# Patient Record
Sex: FEMALE | Race: WHITE | NOT HISPANIC OR LATINO
[De-identification: names, ages, dates, MRNs, and addresses within clinical notes are randomized per-mention and may not be internally consistent; named-entity substitution may affect disease eponyms.]

---

## 2018-01-08 ENCOUNTER — TRANSCRIPTION ENCOUNTER (OUTPATIENT)
Age: 43
End: 2018-01-08

## 2020-11-18 ENCOUNTER — APPOINTMENT (OUTPATIENT)
Dept: OBGYN | Facility: CLINIC | Age: 45
End: 2020-11-18

## 2021-03-22 ENCOUNTER — APPOINTMENT (OUTPATIENT)
Dept: OBGYN | Facility: CLINIC | Age: 46
End: 2021-03-22
Payer: COMMERCIAL

## 2021-03-22 VITALS
WEIGHT: 187 LBS | BODY MASS INDEX: 31.92 KG/M2 | TEMPERATURE: 98.1 F | HEIGHT: 64 IN | SYSTOLIC BLOOD PRESSURE: 124 MMHG | DIASTOLIC BLOOD PRESSURE: 78 MMHG

## 2021-03-22 DIAGNOSIS — Z01.419 ENCOUNTER FOR GYNECOLOGICAL EXAMINATION (GENERAL) (ROUTINE) W/OUT ABNORMAL FINDINGS: ICD-10-CM

## 2021-03-22 DIAGNOSIS — Z78.9 OTHER SPECIFIED HEALTH STATUS: ICD-10-CM

## 2021-03-22 DIAGNOSIS — Z63.5 DISRUPTION OF FAMILY BY SEPARATION AND DIVORCE: ICD-10-CM

## 2021-03-22 PROCEDURE — 99203 OFFICE O/P NEW LOW 30 MIN: CPT | Mod: 25

## 2021-03-22 PROCEDURE — 99386 PREV VISIT NEW AGE 40-64: CPT

## 2021-03-22 PROCEDURE — 36415 COLL VENOUS BLD VENIPUNCTURE: CPT

## 2021-03-22 PROCEDURE — 99072 ADDL SUPL MATRL&STAF TM PHE: CPT

## 2021-03-22 SDOH — SOCIAL STABILITY - SOCIAL INSECURITY: DISRUPTION OF FAMILY BY SEPARATION AND DIVORCE: Z63.5

## 2021-03-22 NOTE — PHYSICAL EXAM
[Appropriately responsive] : appropriately responsive [Alert] : alert [No Acute Distress] : no acute distress [Soft] : soft [Non-tender] : non-tender [Non-distended] : non-distended [Oriented x3] : oriented x3 [Examination Of The Breasts] : a normal appearance [No Discharge] : no discharge [No Masses] : no breast masses were palpable [Labia Majora] : normal [Labia Minora] : normal [No Bleeding] : There was no active vaginal bleeding [Normal] : normal

## 2021-03-22 NOTE — DISCUSSION/SUMMARY
[FreeTextEntry1] : \par Pap and Hormone labs drawn; will follow up with results.\par \par The definitions of perimenopause and menopause were discussed with the patient.  Advised patient to write down and keep track of her periods. \par \par Encouraged consistent condom use.\par \par Her Last mammo was in 01/16/2018 at [facility]; Bi-Rads 1-Negative with heterogenous dense breasts. Mammo and Breast ultrasound Rx given to patient today.\par \par Advised to try organic coconut oil or olive oil for vaginal dryness.\par \par All of her concerns were addressed, questions answered. Patient verbalized understanding.\par \par rto for gyn annual and prn.

## 2021-03-22 NOTE — HISTORY OF PRESENT ILLNESS
[N] : Patient does not use contraception [Y] : Positive pregnancy history [Menarche Age: ____] : age at menarche was [unfilled] [No] : Patient does not have concerns regarding sex [Currently Active] : currently active [Men] : men [Mammogramdate] : 10/2018 [TextBox_19] : as per pt  [PapSmeardate] : 4/2018 [TextBox_31] : as per pt  [LMPDate] : 3/15/2021 [PGHxTotal] : 2 [[facility] : 2 [[facility] : 2 [FreeTextEntry1] : 3/15/2021

## 2021-03-22 NOTE — REVIEW OF SYSTEMS
[Anxiety] : anxiety [Depression] : depression [Sleep Disturbances] : sleep disturbances [PMS/PMDD Symptoms] : PMS/PMDD symptoms [Negative] : Heme/Lymph [FreeTextEntry7] : burning [de-identified] : motor disturbances

## 2021-03-23 LAB
ESTRADIOL SERPL-MCNC: 30 PG/ML
FSH SERPL-MCNC: 31.8 IU/L
HCG SERPL-MCNC: <1 MIU/ML
LH SERPL-ACNC: 9.4 IU/L
TSH SERPL-ACNC: 1.54 UIU/ML

## 2021-03-24 LAB — HPV HIGH+LOW RISK DNA PNL CVX: NOT DETECTED

## 2021-04-04 LAB — CYTOLOGY CVX/VAG DOC THIN PREP: ABNORMAL

## 2021-04-16 ENCOUNTER — APPOINTMENT (OUTPATIENT)
Dept: FAMILY MEDICINE | Facility: CLINIC | Age: 46
End: 2021-04-16
Payer: COMMERCIAL

## 2021-04-16 VITALS
OXYGEN SATURATION: 96 % | TEMPERATURE: 98.1 F | HEART RATE: 84 BPM | WEIGHT: 187 LBS | HEIGHT: 64 IN | SYSTOLIC BLOOD PRESSURE: 122 MMHG | DIASTOLIC BLOOD PRESSURE: 78 MMHG | BODY MASS INDEX: 31.92 KG/M2

## 2021-04-16 DIAGNOSIS — M51.36 OTHER INTERVERTEBRAL DISC DEGENERATION, LUMBAR REGION: ICD-10-CM

## 2021-04-16 DIAGNOSIS — Z87.39 PERSONAL HISTORY OF OTHER DISEASES OF THE MUSCULOSKELETAL SYSTEM AND CONNECTIVE TISSUE: ICD-10-CM

## 2021-04-16 DIAGNOSIS — Z72.0 TOBACCO USE: ICD-10-CM

## 2021-04-16 DIAGNOSIS — N89.8 OTHER SPECIFIED NONINFLAMMATORY DISORDERS OF VAGINA: ICD-10-CM

## 2021-04-16 DIAGNOSIS — Z87.42 PERSONAL HISTORY OF OTHER DISEASES OF THE FEMALE GENITAL TRACT: ICD-10-CM

## 2021-04-16 DIAGNOSIS — Z23 ENCOUNTER FOR IMMUNIZATION: ICD-10-CM

## 2021-04-16 PROCEDURE — 99386 PREV VISIT NEW AGE 40-64: CPT | Mod: 25

## 2021-04-16 PROCEDURE — G0444 DEPRESSION SCREEN ANNUAL: CPT

## 2021-04-16 PROCEDURE — G0442 ANNUAL ALCOHOL SCREEN 15 MIN: CPT

## 2021-04-16 PROCEDURE — 99072 ADDL SUPL MATRL&STAF TM PHE: CPT

## 2021-04-16 RX ORDER — ALPRAZOLAM 2 MG/1
TABLET ORAL
Refills: 0 | Status: DISCONTINUED | COMMUNITY
End: 2021-04-16

## 2021-04-16 NOTE — HEALTH RISK ASSESSMENT
[Good] : ~his/her~  mood as  good [Yes] : Yes [Monthly or less (1 pt)] : Monthly or less (1 point) [1 or 2 (0 pts)] : 1 or 2 (0 points) [Never (0 pts)] : Never (0 points) [No] : In the past 12 months have you used drugs other than those required for medical reasons? No [No falls in past year] : Patient reported no falls in the past year [3] : 2) Feeling down, depressed, or hopeless for nearly every day (3) [Patient reported mammogram was normal] : Patient reported mammogram was normal [Patient reported PAP Smear was normal] : Patient reported PAP Smear was normal [HIV test declined] : HIV test declined [Hepatitis C test declined] : Hepatitis C test declined [Alone] : lives alone [Employed] : employed [] :  [Feels Safe at Home] : Feels safe at home [Fully functional (bathing, dressing, toileting, transferring, walking, feeding)] : Fully functional (bathing, dressing, toileting, transferring, walking, feeding) [Fully functional (using the telephone, shopping, preparing meals, housekeeping, doing laundry, using] : Fully functional and needs no help or supervision to perform IADLs (using the telephone, shopping, preparing meals, housekeeping, doing laundry, using transportation, managing medications and managing finances) [] : No [Audit-CScore] : 1 [de-identified] : needs improvement [de-identified] : needs improvement [[id number]] : 6 [[id number]] : 22 [Sexually Active] : not sexually active [High Risk Behavior] : no high risk behavior [Reports changes in hearing] : Reports no changes in hearing [Reports changes in vision] : Reports no changes in vision [Reports changes in dental health] : Reports no changes in dental health [MammogramDate] : 04/19 [PapSmearDate] : 03/21 [PapSmearComments] : NILM

## 2021-04-16 NOTE — HISTORY OF PRESENT ILLNESS
[FreeTextEntry1] : NP/CPE [de-identified] : 44 yo female presents for annual physical. Pt has hx of depression, anxiety, and ADD. She is here for medication refill. Reports depression, difficulty concentrating, sleeping, loss of interest, loss of energy, difficulty concentrating. No suicidal/homicidal ideation. Denies fever, chills, cp, palpitations, sob, nv, heat/cold intolerance, dizziness, melena, hematochezia, muscle weakness, loss of sensation, bowel/bladder incontinence or calf pain.\par

## 2021-04-16 NOTE — PHYSICAL EXAM
[Normal Mental Status] : the patient's orientation, memory, attention, language and fund of knowledge were normal [Anxious] : anxious [Depressed] : depressed [Normal Rate] : a normal rate [Normal Rhythm] : a normal rhythm [Normal Tone] : normal tone [Normal Volume] : normal volume [Normal] : normal throught processes [Normal Rate Of Thought] : a normal rate of thought [Normal Associations] :  no deficiency [Agitated] : not agitated [Flat] : not flat [Labile] : not labile [Suicidal Ideation] : denied suicidal ideation [Suicidal Intent] : denied suicidal intent [Suicidal Plan] : denied suicidal plans [Homicidal Ideation] : denied homicidal ideation [Homicidal Intent] : denied homicidal intention [Homicidal Plan] : denied homicidal plans

## 2021-04-19 ENCOUNTER — APPOINTMENT (OUTPATIENT)
Dept: FAMILY MEDICINE | Facility: CLINIC | Age: 46
End: 2021-04-19
Payer: COMMERCIAL

## 2021-04-19 VITALS
SYSTOLIC BLOOD PRESSURE: 128 MMHG | OXYGEN SATURATION: 98 % | BODY MASS INDEX: 31.92 KG/M2 | DIASTOLIC BLOOD PRESSURE: 88 MMHG | HEIGHT: 64 IN | TEMPERATURE: 98 F | WEIGHT: 187 LBS | HEART RATE: 88 BPM

## 2021-04-19 PROCEDURE — 99214 OFFICE O/P EST MOD 30 MIN: CPT

## 2021-04-19 PROCEDURE — 99072 ADDL SUPL MATRL&STAF TM PHE: CPT

## 2021-04-19 RX ORDER — OSELTAMIVIR PHOSPHATE 75 MG/1
75 CAPSULE ORAL
Qty: 10 | Refills: 0 | Status: COMPLETED | COMMUNITY
Start: 2021-02-17

## 2021-04-19 RX ORDER — CLONAZEPAM 0.5 MG/1
0.5 TABLET ORAL
Qty: 90 | Refills: 0 | Status: COMPLETED | COMMUNITY
Start: 2020-12-16

## 2021-04-19 RX ORDER — AZITHROMYCIN 250 MG/1
250 TABLET, FILM COATED ORAL
Qty: 6 | Refills: 0 | Status: COMPLETED | COMMUNITY
Start: 2020-11-04

## 2021-04-19 RX ORDER — VENLAFAXINE HYDROCHLORIDE 150 MG/1
150 CAPSULE, EXTENDED RELEASE ORAL
Qty: 180 | Refills: 0 | Status: COMPLETED | COMMUNITY
Start: 2021-01-18

## 2021-04-19 RX ORDER — AMOXICILLIN 500 MG/1
500 CAPSULE ORAL
Qty: 14 | Refills: 0 | Status: COMPLETED | COMMUNITY
Start: 2021-02-17

## 2021-04-19 RX ORDER — ALPRAZOLAM 0.25 MG/1
0.25 TABLET ORAL
Qty: 30 | Refills: 0 | Status: COMPLETED | COMMUNITY
Start: 2021-02-25

## 2021-04-19 NOTE — PLAN
[FreeTextEntry1] : d/c ALL OTC NSAIDs \par \par Medrol 40mg IM L deltoid \par see med orders\par OTC Tylenol prn for pain \par \par see radiology orders\par \par Ortho f/u  scheduled 4/22/21

## 2021-04-19 NOTE — HISTORY OF PRESENT ILLNESS
[FreeTextEntry8] : Pt c/o shoulder pain\par \par 46 yo female presents to office c 1 week hx of intermittent R shoulder pain.  pt states pain acutely worsened in the last 24 hrs.   pt nearly in tears from the pain at time of office visit.\par no known injury or trauma to area, HOWEVER, does admit to moving of furniture early last week \par +ve numbness/paresthesias R hand \par pt states took 3 OTC Aleve with minimal improvement \par R hand dominant

## 2021-04-19 NOTE — PHYSICAL EXAM
[EOMI] : extraocular movements intact [Normal Outer Ear/Nose] : the outer ears and nose were normal in appearance [No JVD] : no jugular venous distention [No Respiratory Distress] : no respiratory distress  [No Accessory Muscle Use] : no accessory muscle use [Normal Rate] : normal rate  [Clear to Auscultation] : lungs were clear to auscultation bilaterally [Regular Rhythm] : with a regular rhythm [Normal S1, S2] : normal S1 and S2 [No Edema] : there was no peripheral edema [Non-distended] : non-distended [No CVA Tenderness] : no CVA  tenderness [No Rash] : no rash [Coordination Grossly Intact] : coordination grossly intact [No Focal Deficits] : no focal deficits [Normal Gait] : normal gait [Normal Affect] : the affect was normal [Normal Mood] : the mood was normal [Normal Insight/Judgement] : insight and judgment were intact [de-identified] : C and T-spine NT to palpation  [de-identified] : moderated distress secondary to R shoulder pain  [de-identified] : +ve tenderness over acromion process RUE   significantly decreased ROM RUE secondary to pain

## 2021-04-20 ENCOUNTER — TRANSCRIPTION ENCOUNTER (OUTPATIENT)
Age: 46
End: 2021-04-20

## 2021-04-20 ENCOUNTER — APPOINTMENT (OUTPATIENT)
Dept: RADIOLOGY | Facility: CLINIC | Age: 46
End: 2021-04-20
Payer: COMMERCIAL

## 2021-04-20 PROCEDURE — 73030 X-RAY EXAM OF SHOULDER: CPT | Mod: RT

## 2021-04-24 LAB
25(OH)D3 SERPL-MCNC: 32.2 NG/ML
ALBUMIN SERPL ELPH-MCNC: 4.4 G/DL
ALP BLD-CCNC: 70 U/L
ALT SERPL-CCNC: 12 U/L
ANION GAP SERPL CALC-SCNC: 12 MMOL/L
APPEARANCE: CLEAR
AST SERPL-CCNC: 15 U/L
BACTERIA: NEGATIVE
BASOPHILS # BLD AUTO: 0.09 K/UL
BASOPHILS NFR BLD AUTO: 1.3 %
BILIRUB SERPL-MCNC: 0.2 MG/DL
BILIRUBIN URINE: NEGATIVE
BLOOD URINE: NEGATIVE
BUN SERPL-MCNC: 18 MG/DL
CALCIUM SERPL-MCNC: 9.8 MG/DL
CHLORIDE SERPL-SCNC: 103 MMOL/L
CHOLEST SERPL-MCNC: 181 MG/DL
CO2 SERPL-SCNC: 23 MMOL/L
COLOR: YELLOW
CREAT SERPL-MCNC: 0.75 MG/DL
EOSINOPHIL # BLD AUTO: 0.17 K/UL
EOSINOPHIL NFR BLD AUTO: 2.4 %
ESTIMATED AVERAGE GLUCOSE: 103 MG/DL
GLUCOSE QUALITATIVE U: NEGATIVE
GLUCOSE SERPL-MCNC: 87 MG/DL
HBA1C MFR BLD HPLC: 5.2 %
HCT VFR BLD CALC: 44.6 %
HDLC SERPL-MCNC: 69 MG/DL
HGB BLD-MCNC: 13.9 G/DL
HYALINE CASTS: 1 /LPF
IMM GRANULOCYTES NFR BLD AUTO: 0.4 %
KETONES URINE: NEGATIVE
LDLC SERPL CALC-MCNC: 96 MG/DL
LEUKOCYTE ESTERASE URINE: NEGATIVE
LYMPHOCYTES # BLD AUTO: 2.32 K/UL
LYMPHOCYTES NFR BLD AUTO: 33 %
MAN DIFF?: NORMAL
MCHC RBC-ENTMCNC: 29.5 PG
MCHC RBC-ENTMCNC: 31.2 GM/DL
MCV RBC AUTO: 94.7 FL
MICROSCOPIC-UA: NORMAL
MONOCYTES # BLD AUTO: 0.7 K/UL
MONOCYTES NFR BLD AUTO: 10 %
NEUTROPHILS # BLD AUTO: 3.71 K/UL
NEUTROPHILS NFR BLD AUTO: 52.9 %
NITRITE URINE: NEGATIVE
NONHDLC SERPL-MCNC: 112 MG/DL
PH URINE: 8
PLATELET # BLD AUTO: 344 K/UL
POTASSIUM SERPL-SCNC: 4.8 MMOL/L
PROT SERPL-MCNC: 6.7 G/DL
PROTEIN URINE: ABNORMAL
RBC # BLD: 4.71 M/UL
RBC # FLD: 13.2 %
RED BLOOD CELLS URINE: 4 /HPF
SODIUM SERPL-SCNC: 138 MMOL/L
SPECIFIC GRAVITY URINE: 1.04
SQUAMOUS EPITHELIAL CELLS: 10 /HPF
TRIGL SERPL-MCNC: 82 MG/DL
TSH SERPL-ACNC: 3.13 UIU/ML
UROBILINOGEN URINE: ABNORMAL
WBC # FLD AUTO: 7.02 K/UL
WHITE BLOOD CELLS URINE: 1 /HPF

## 2021-05-03 ENCOUNTER — TRANSCRIPTION ENCOUNTER (OUTPATIENT)
Age: 46
End: 2021-05-03

## 2021-05-05 ENCOUNTER — APPOINTMENT (OUTPATIENT)
Dept: PHYSICAL MEDICINE AND REHAB | Facility: CLINIC | Age: 46
End: 2021-05-05

## 2021-05-17 ENCOUNTER — APPOINTMENT (OUTPATIENT)
Dept: FAMILY MEDICINE | Facility: CLINIC | Age: 46
End: 2021-05-17

## 2021-05-25 ENCOUNTER — APPOINTMENT (OUTPATIENT)
Dept: FAMILY MEDICINE | Facility: CLINIC | Age: 46
End: 2021-05-25
Payer: COMMERCIAL

## 2021-05-25 VITALS
HEIGHT: 64 IN | TEMPERATURE: 98.2 F | SYSTOLIC BLOOD PRESSURE: 118 MMHG | BODY MASS INDEX: 30.9 KG/M2 | DIASTOLIC BLOOD PRESSURE: 70 MMHG | HEART RATE: 80 BPM | OXYGEN SATURATION: 98 % | WEIGHT: 181 LBS

## 2021-05-25 DIAGNOSIS — M47.816 SPONDYLOSIS W/OUT MYELOPATHY OR RADICULOPATHY, LUMBAR REGION: ICD-10-CM

## 2021-05-25 DIAGNOSIS — M53.2X6 SPINAL INSTABILITIES, LUMBAR REGION: ICD-10-CM

## 2021-05-25 PROCEDURE — 99214 OFFICE O/P EST MOD 30 MIN: CPT

## 2021-05-25 RX ORDER — DEXTROAMPHETAMINE SACCHARATE, AMPHETAMINE ASPARTATE, DEXTROAMPHETAMINE SULFATE AND AMPHETAMINE SULFATE 7.5; 7.5; 7.5; 7.5 MG/1; MG/1; MG/1; MG/1
30 TABLET ORAL DAILY
Qty: 30 | Refills: 0 | Status: DISCONTINUED | COMMUNITY
End: 2021-05-25

## 2021-05-25 RX ORDER — METHYLPREDNISOLONE 4 MG/1
4 TABLET ORAL
Qty: 1 | Refills: 0 | Status: DISCONTINUED | COMMUNITY
Start: 2021-04-19 | End: 2021-05-25

## 2021-05-25 NOTE — HISTORY OF PRESENT ILLNESS
[FreeTextEntry1] : follow up on ADD\par  [de-identified] : 46 yo female presents of ADHD. Pt reports doing well on current dose however would like to try extended release form. Helps with concentration. Denies fever, chills, cp, palpitations, sob, nv, heat/cold intolerance, dizziness, melena, hematochezia, muscle weakness, loss of sensation, bowel/bladder incontinence or calf pain.\par

## 2021-05-25 NOTE — ASSESSMENT
[FreeTextEntry1] : ADD: improving, change medication to ER version, f/u in 4 wks\par Anx/depression: improving, decreased venlafaxine to 75 mg po daily, no suicidal/homicidal ideation, f/u therapist, pt to make appt with psychiatrist\par Lumbar spondylosis/spinal instability: improved, pt was referred to ortho at previous visit\par RTC 4 wks

## 2021-06-01 ENCOUNTER — APPOINTMENT (OUTPATIENT)
Dept: FAMILY MEDICINE | Facility: CLINIC | Age: 46
End: 2021-06-01

## 2021-06-09 ENCOUNTER — RESULT CHARGE (OUTPATIENT)
Age: 46
End: 2021-06-09

## 2021-06-10 ENCOUNTER — APPOINTMENT (OUTPATIENT)
Dept: FAMILY MEDICINE | Facility: CLINIC | Age: 46
End: 2021-06-10
Payer: COMMERCIAL

## 2021-06-10 VITALS
DIASTOLIC BLOOD PRESSURE: 70 MMHG | OXYGEN SATURATION: 96 % | HEART RATE: 77 BPM | WEIGHT: 184 LBS | HEIGHT: 64 IN | BODY MASS INDEX: 31.41 KG/M2 | TEMPERATURE: 97.6 F | SYSTOLIC BLOOD PRESSURE: 114 MMHG

## 2021-06-10 PROCEDURE — 87880 STREP A ASSAY W/OPTIC: CPT | Mod: QW

## 2021-06-10 PROCEDURE — 99214 OFFICE O/P EST MOD 30 MIN: CPT | Mod: 25

## 2021-06-10 RX ORDER — DEXTROAMPHETAMINE SACCHARATE, AMPHETAMINE ASPARTATE MONOHYDRATE, DEXTROAMPHETAMINE SULFATE AND AMPHETAMINE SULFATE 7.5; 7.5; 7.5; 7.5 MG/1; MG/1; MG/1; MG/1
30 CAPSULE, EXTENDED RELEASE ORAL DAILY
Qty: 30 | Refills: 0 | Status: DISCONTINUED | COMMUNITY
Start: 2021-05-25 | End: 2021-06-10

## 2021-06-10 NOTE — HISTORY OF PRESENT ILLNESS
[FreeTextEntry8] : 46 yo female presents with complaint of left ear pain for 2 days, sharp, 6/10 in severity. In addition, she has been having a sore throat with dry, nonproductive cough. She has a hx of asthma which she says has recently worsened. She used her inhaler 3 x yesterday which improved mild sob/wheezing. She usually has 1 mild exacerbation per month. Denies fever, chills, cp, palpitations, sob, nv, heat/cold intolerance, dizziness, melena, hematochezia, muscle weakness, loss of sensation, bowel/bladder incontinence or calf pain.\par

## 2021-06-10 NOTE — ASSESSMENT
[FreeTextEntry1] : Mild intermittent asthma with mild exacerbation: start nebulizer prn for sob/wheezing, start albuterol hfa prn for sob/wheezing, start prednisone 20 mg po bid x 5 days, f/u in one week, advised pt go to ER if sob/wheezing not improved with hfa/nebulizer\par Acute otitis media: start Tylenol prn for pain/fever, start amox/clav bid x 7 days\par Sore throat: poct rapid strep negative, amox/clav will cover for strep throat, f/u throat culture, c/w lozenges\par RTC 1 wk

## 2021-06-10 NOTE — PHYSICAL EXAM
[No Respiratory Distress] : no respiratory distress  [No Accessory Muscle Use] : no accessory muscle use [Normal] : affect was normal and insight and judgment were intact [de-identified] : opaque TM left ear, mod pharynageal erythema, no exudates [de-identified] : mild wheezing on left lower lung

## 2021-06-14 LAB — BACTERIA THROAT CULT: NORMAL

## 2021-06-23 NOTE — HISTORY OF PRESENT ILLNESS
[de-identified] : This is a RHD/LHD female who presents with c/o left/right elbow/forearm pain.  Patient reports the elbow has been sore for xx, but is worsening in severity. Pain is worst in the morning/during the day/at the end of the day.  Pain does not awaken patient from sleep. The arm feels weak.  S/he is now having difficulty lifting even a cup of coffee.  Patient denies any traumatic injury, but admits to xx.  Patient denies any neck or shoulder pain.  There are no paresthesias or numbness. Patient denies loss of ROM. Patient has tried ice/heat.  Patient has taken xx, which has not provided adequate relief.  Patient denies/reports having similar sxs in the past.\par \par

## 2021-06-23 NOTE — DISCUSSION/SUMMARY
[de-identified] : X-rays were reviewed with patient.\par discussed mechanisms of injury, the importance of stretching, the role of icing, NSAIDs, PT, wrist splint vs. tennis elbow strap.\par \par

## 2021-06-23 NOTE — PHYSICAL EXAM
[de-identified] : Alert & oriented to person place & time\par No acute distress. Normal affect.\par Normocephalic atraumatic\par Extraocular muscles intact \par Normal respiratory rate and effort, no nasal flaring or use of accessory muscles \par Skin warm and well perfused, no edema, no lymphadenopathy\par \par Left Elbow:\par No deformity\par erythema/ecchymosis\par swelling\par + tenderness lateral epicondyle\par nontender olecranon, medial epicondyle\par + tenderness proximal forearm musculature\par FROM at elbow\par + pain with resistive wrist extension\par + pain with passive wrist flexion\par FROM all fingers\par negative Tinel at elbow and wrist\par +/- ulnar distribution numbness with elbow flexion >30 seconds\par 5/5 motor elbow, wrist and hand\par sensation intact to light touch\par 2+ radial pulse\par \par Right Elbow:\par No deformity\par erythema/ecchymosis\par swelling\par + tenderness lateral epicondyle\par nontender olecranon, medial epicondyle\par + tenderness proximal forearm musculature\par FROM at elbow\par + pain with resistive wrist extension\par + pain with passive wrist flexion\par FROM all fingers\par negative Tinel at elbow and wrist\par +/- ulnar distribution numbness with elbow flexion >30 seconds\par 5/5 motor elbow, wrist and hand\par sensation intact to light touch\par 2+ radial pulse\par \par \par  [de-identified] : X-rays obtained of the                       reveal\par

## 2021-06-24 ENCOUNTER — APPOINTMENT (OUTPATIENT)
Dept: ORTHOPEDIC SURGERY | Facility: CLINIC | Age: 46
End: 2021-06-24

## 2021-07-01 ENCOUNTER — APPOINTMENT (OUTPATIENT)
Dept: FAMILY MEDICINE | Facility: CLINIC | Age: 46
End: 2021-07-01
Payer: COMMERCIAL

## 2021-07-01 VITALS
SYSTOLIC BLOOD PRESSURE: 116 MMHG | OXYGEN SATURATION: 98 % | HEART RATE: 88 BPM | DIASTOLIC BLOOD PRESSURE: 74 MMHG | HEIGHT: 64 IN | TEMPERATURE: 97.5 F | BODY MASS INDEX: 29.37 KG/M2 | WEIGHT: 172 LBS

## 2021-07-01 PROCEDURE — 99214 OFFICE O/P EST MOD 30 MIN: CPT

## 2021-07-01 RX ORDER — AMOXICILLIN AND CLAVULANATE POTASSIUM 875; 125 MG/1; MG/1
875-125 TABLET, COATED ORAL
Qty: 14 | Refills: 0 | Status: DISCONTINUED | COMMUNITY
Start: 2021-06-10 | End: 2021-07-01

## 2021-07-01 RX ORDER — ACETAMINOPHEN 500 MG/1
500 TABLET ORAL
Qty: 180 | Refills: 0 | Status: DISCONTINUED | COMMUNITY
Start: 2021-06-10 | End: 2021-07-01

## 2021-07-01 RX ORDER — PREDNISONE 20 MG/1
20 TABLET ORAL
Qty: 10 | Refills: 0 | Status: DISCONTINUED | COMMUNITY
Start: 2021-06-10 | End: 2021-07-01

## 2021-07-01 NOTE — HISTORY OF PRESENT ILLNESS
[FreeTextEntry1] : right shoulder pain [de-identified] : 46 yo female presents with complaint of worsening pain in right shoulder. She says pain is 8/10 in severity, worse with use, sharp. She went to the ER yesterday and had an xray taken which they said was normal at [facility]. She was discharged on NSAIDs. SHe reports that she gets intermittent pain in other joints including left shoulder joint and in her hands. Denies fever, chills, cp, palpitations, sob, nv, heat/cold intolerance, dizziness, melena, hematochezia, muscle weakness, loss of sensation, bowel/bladder incontinence or calf pain.\par

## 2021-07-01 NOTE — PHYSICAL EXAM
[Normal] : normal gait, coordination grossly intact, no focal deficits and deep tendon reflexes were 2+ and symmetric [de-identified] : right shoulder no swelling, ROM limited 2/2 pain, sensation intact throughout RUE, hands  [de-identified] : see msk, ROM limited 2/2 pain

## 2021-07-05 LAB
ALBUMIN SERPL ELPH-MCNC: 4.2 G/DL
ALP BLD-CCNC: 84 U/L
ALT SERPL-CCNC: 14 U/L
ANA SER IF-ACNC: NEGATIVE
AST SERPL-CCNC: 13 U/L
BILIRUB DIRECT SERPL-MCNC: 0.1 MG/DL
BILIRUB INDIRECT SERPL-MCNC: 0.2 MG/DL
BILIRUB SERPL-MCNC: 0.3 MG/DL
CCP AB SER IA-ACNC: >250 UNITS
ERYTHROCYTE [SEDIMENTATION RATE] IN BLOOD BY WESTERGREN METHOD: 38 MM/HR
HBV CORE IGG+IGM SER QL: NONREACTIVE
HBV CORE IGM SER QL: NONREACTIVE
HBV SURFACE AB SER QL: NONREACTIVE
HBV SURFACE AG SER QL: NONREACTIVE
HCV AB SER QL: NONREACTIVE
HCV S/CO RATIO: 0.11 S/CO
PROT SERPL-MCNC: 6.7 G/DL
RF+CCP IGG SER-IMP: ABNORMAL
RHEUMATOID FACT SER QL: 390 IU/ML

## 2021-07-07 ENCOUNTER — TRANSCRIPTION ENCOUNTER (OUTPATIENT)
Age: 46
End: 2021-07-07

## 2021-07-12 ENCOUNTER — TRANSCRIPTION ENCOUNTER (OUTPATIENT)
Age: 46
End: 2021-07-12

## 2021-07-12 ENCOUNTER — APPOINTMENT (OUTPATIENT)
Dept: RHEUMATOLOGY | Facility: CLINIC | Age: 46
End: 2021-07-12

## 2021-07-13 ENCOUNTER — APPOINTMENT (OUTPATIENT)
Dept: FAMILY MEDICINE | Facility: CLINIC | Age: 46
End: 2021-07-13
Payer: COMMERCIAL

## 2021-07-13 VITALS
HEART RATE: 75 BPM | SYSTOLIC BLOOD PRESSURE: 120 MMHG | DIASTOLIC BLOOD PRESSURE: 80 MMHG | OXYGEN SATURATION: 98 % | BODY MASS INDEX: 29.37 KG/M2 | HEIGHT: 64 IN | TEMPERATURE: 97.7 F | WEIGHT: 172 LBS

## 2021-07-13 PROCEDURE — 99213 OFFICE O/P EST LOW 20 MIN: CPT

## 2021-07-13 NOTE — HISTORY OF PRESENT ILLNESS
[FreeTextEntry8] : 44 yo female presents with complaint of persistent b/l shoulder pain and elbow pain, 7/10 in severity, ongoing for the past few months. She was found to have elevated ESR/CCP/RF. Has appt with rheumatology in Oct. Denies fever, chills, cp, palpitations, sob, nv, heat/cold intolerance, dizziness, melena, hematochezia, muscle weakness, loss of sensation, bowel/bladder incontinence or calf pain.\par

## 2021-07-13 NOTE — ASSESSMENT
[FreeTextEntry1] : Polyarthritis: start medrol jesus, c/w current regimen, f/u rheumatology, refer to pain management\par RTC 2 wks

## 2021-07-13 NOTE — PHYSICAL EXAM
[Normal] : normal gait, coordination grossly intact, no focal deficits and deep tendon reflexes were 2+ and symmetric [de-identified] : no shoulder swelling, ROM intact, no deformities, sensation and motor strength intact

## 2021-07-15 ENCOUNTER — APPOINTMENT (OUTPATIENT)
Dept: RHEUMATOLOGY | Facility: CLINIC | Age: 46
End: 2021-07-15
Payer: COMMERCIAL

## 2021-07-15 ENCOUNTER — RESULT REVIEW (OUTPATIENT)
Age: 46
End: 2021-07-15

## 2021-07-15 VITALS
SYSTOLIC BLOOD PRESSURE: 120 MMHG | TEMPERATURE: 98 F | RESPIRATION RATE: 17 BRPM | HEIGHT: 64 IN | HEART RATE: 74 BPM | DIASTOLIC BLOOD PRESSURE: 70 MMHG | WEIGHT: 175 LBS | BODY MASS INDEX: 29.88 KG/M2

## 2021-07-15 DIAGNOSIS — M25.50 PAIN IN UNSPECIFIED JOINT: ICD-10-CM

## 2021-07-15 DIAGNOSIS — G89.29 LUMBAGO WITH SCIATICA, RIGHT SIDE: ICD-10-CM

## 2021-07-15 DIAGNOSIS — M75.51 BURSITIS OF RIGHT SHOULDER: ICD-10-CM

## 2021-07-15 DIAGNOSIS — Z98.890 OTHER SPECIFIED POSTPROCEDURAL STATES: ICD-10-CM

## 2021-07-15 DIAGNOSIS — M54.41 LUMBAGO WITH SCIATICA, RIGHT SIDE: ICD-10-CM

## 2021-07-15 DIAGNOSIS — U07.1 COVID-19: ICD-10-CM

## 2021-07-15 DIAGNOSIS — Z78.9 OTHER SPECIFIED HEALTH STATUS: ICD-10-CM

## 2021-07-15 DIAGNOSIS — Z86.59 PERSONAL HISTORY OF OTHER MENTAL AND BEHAVIORAL DISORDERS: ICD-10-CM

## 2021-07-15 DIAGNOSIS — M75.22 BICIPITAL TENDINITIS, LEFT SHOULDER: ICD-10-CM

## 2021-07-15 DIAGNOSIS — M77.8 OTHER ENTHESOPATHIES, NOT ELSEWHERE CLASSIFIED: ICD-10-CM

## 2021-07-15 DIAGNOSIS — F17.200 NICOTINE DEPENDENCE, UNSPECIFIED, UNCOMPLICATED: ICD-10-CM

## 2021-07-15 DIAGNOSIS — Z87.19 PERSONAL HISTORY OF OTHER DISEASES OF THE DIGESTIVE SYSTEM: ICD-10-CM

## 2021-07-15 DIAGNOSIS — M75.21 BICIPITAL TENDINITIS, RIGHT SHOULDER: ICD-10-CM

## 2021-07-15 DIAGNOSIS — M54.17 RADICULOPATHY, LUMBOSACRAL REGION: ICD-10-CM

## 2021-07-15 PROCEDURE — 99205 OFFICE O/P NEW HI 60 MIN: CPT

## 2021-07-15 RX ORDER — OXYCODONE 5 MG/1
5 TABLET ORAL TWICE DAILY
Qty: 10 | Refills: 0 | Status: DISCONTINUED | COMMUNITY
Start: 2021-07-01 | End: 2021-07-15

## 2021-07-15 RX ORDER — ALBUTEROL 90 MCG
AEROSOL (GRAM) INHALATION
Refills: 0 | Status: DISCONTINUED | COMMUNITY
End: 2021-07-15

## 2021-07-16 ENCOUNTER — APPOINTMENT (OUTPATIENT)
Dept: ORTHOPEDIC SURGERY | Facility: CLINIC | Age: 46
End: 2021-07-16

## 2021-07-19 PROBLEM — U07.1 COVID-19 VIRUS INFECTION: Status: RESOLVED | Noted: 2021-07-19 | Resolved: 2021-07-19

## 2021-07-19 PROBLEM — F17.200 CURRENT SOME DAY SMOKER: Status: RESOLVED | Noted: 2021-03-22 | Resolved: 2021-04-16

## 2021-07-19 PROBLEM — Z86.59 HISTORY OF DEPRESSION: Status: RESOLVED | Noted: 2021-07-19 | Resolved: 2021-07-19

## 2021-07-19 PROBLEM — M54.41 CHRONIC BILATERAL LOW BACK PAIN WITH RIGHT-SIDED SCIATICA: Status: ACTIVE | Noted: 2021-07-19

## 2021-07-19 PROBLEM — M54.17 LUMBOSACRAL RADICULOPATHY: Status: ACTIVE | Noted: 2021-07-19

## 2021-07-19 PROBLEM — Z78.9: Status: RESOLVED | Noted: 2021-07-19 | Resolved: 2021-07-19

## 2021-07-19 PROBLEM — Z86.59 HISTORY OF ATTENTION DEFICIT HYPERACTIVITY DISORDER (ADHD): Status: RESOLVED | Noted: 2021-07-19 | Resolved: 2021-07-19

## 2021-07-19 PROBLEM — Z98.890 HISTORY OF RECENT DENTAL PROCEDURE: Status: RESOLVED | Noted: 2021-07-19 | Resolved: 2021-07-19

## 2021-07-19 PROBLEM — Z87.19 HISTORY OF GASTRIC ULCER: Status: RESOLVED | Noted: 2021-07-19 | Resolved: 2021-07-19

## 2021-07-19 RX ORDER — ACETAMINOPHEN 500 MG
500 TABLET ORAL
Refills: 0 | Status: DISCONTINUED | COMMUNITY
End: 2021-07-19

## 2021-07-19 RX ORDER — NAPROXEN SODIUM 220 MG
220 TABLET ORAL
Refills: 0 | Status: DISCONTINUED | COMMUNITY
End: 2021-07-19

## 2021-07-19 RX ORDER — VENLAFAXINE 75 MG/1
75 TABLET ORAL DAILY
Refills: 0 | Status: DISCONTINUED | COMMUNITY
End: 2021-07-19

## 2021-07-19 RX ORDER — CYCLOBENZAPRINE HYDROCHLORIDE 5 MG/1
5 TABLET, FILM COATED ORAL
Qty: 7 | Refills: 0 | Status: DISCONTINUED | COMMUNITY
Start: 2021-07-01 | End: 2021-07-19

## 2021-07-19 RX ORDER — CALCIUM 500 MG
500 TABLET ORAL
Qty: 90 | Refills: 3 | Status: ACTIVE | COMMUNITY
Start: 2021-07-19 | End: 1900-01-01

## 2021-07-19 RX ORDER — ALBUTEROL SULFATE 90 UG/1
108 (90 BASE) INHALANT RESPIRATORY (INHALATION) EVERY 4 HOURS
Qty: 1 | Refills: 2 | Status: DISCONTINUED | COMMUNITY
Start: 2021-06-10 | End: 2021-07-19

## 2021-08-02 ENCOUNTER — APPOINTMENT (OUTPATIENT)
Dept: FAMILY MEDICINE | Facility: CLINIC | Age: 46
End: 2021-08-02

## 2021-08-05 ENCOUNTER — APPOINTMENT (OUTPATIENT)
Dept: PHYSICAL MEDICINE AND REHAB | Facility: CLINIC | Age: 46
End: 2021-08-05
Payer: COMMERCIAL

## 2021-08-05 VITALS
SYSTOLIC BLOOD PRESSURE: 137 MMHG | WEIGHT: 170 LBS | BODY MASS INDEX: 28.32 KG/M2 | HEIGHT: 65 IN | RESPIRATION RATE: 16 BRPM | DIASTOLIC BLOOD PRESSURE: 101 MMHG | HEART RATE: 101 BPM

## 2021-08-05 DIAGNOSIS — Z78.9 OTHER SPECIFIED HEALTH STATUS: ICD-10-CM

## 2021-08-05 DIAGNOSIS — M25.511 PAIN IN RIGHT SHOULDER: ICD-10-CM

## 2021-08-05 PROCEDURE — 99204 OFFICE O/P NEW MOD 45 MIN: CPT

## 2021-08-05 RX ORDER — METHYLPREDNISOLONE 4 MG/1
4 TABLET ORAL
Qty: 1 | Refills: 0 | Status: DISCONTINUED | COMMUNITY
Start: 2021-07-13 | End: 2021-08-05

## 2021-08-05 RX ORDER — ERGOCALCIFEROL 1.25 MG/1
1.25 MG CAPSULE, LIQUID FILLED ORAL
Qty: 12 | Refills: 0 | Status: DISCONTINUED | COMMUNITY
Start: 2021-07-15 | End: 2021-08-05

## 2021-08-05 RX ORDER — PREDNISONE 10 MG/1
10 TABLET ORAL
Qty: 100 | Refills: 0 | Status: DISCONTINUED | COMMUNITY
Start: 2021-07-15 | End: 2021-08-05

## 2021-08-05 NOTE — PHYSICAL EXAM
[FreeTextEntry1] : NAD\par A&Ox3\par Non-obese\par C-spine ROM:\par Costa's: +/- right; neg left\par Lhermitte's: neg\par Spurling's: neg\par DTR's: 2+ B/T/Br/K/A\par MMT: 5/5 b/l UE\par Sensation: SILT\par Right Shoulder\par Near full PROM ABD/FF w/ pain endROM\par Neer's: weakly +\par Hawkin's: neg\par MMT 5-/5 B/L D/SS/IS\par Distal SS tendon insertion site VTTP\par Right elbow - no synovitis\par Lateral epicondyle VTTP\par Tinel's +\par Right wrist - no synovitis\par No ulnar deviation MCPs\par Phalen's +\par Periscapular myofascial TPs\par

## 2021-08-05 NOTE — HISTORY OF PRESENT ILLNESS
[FreeTextEntry1] : 45 y.o. RHD F w/ h/o inflammatory arthritis (+ RA), Raynaud's and FM presents to office w/ c/o widespread MSK pain worse in MCP joints hands, wrists, elbows and neck.  Pt. was recently diagnosed w/ RA by Dr. Kleiner (Rheum) and is pending radiological work up.  Pt. describes neck pain and numbness sensation going down her back w/ N/T in hands.  States she "cannot hold onto things".  Pt. has h/o LBP which was evaluated with MRI L-spine.  No recent P.T., chiro or acupuncture.  Pt. had CSI last February 2021 w/o relief.  Of note, pt. states that she is not taking the medications recently prescribed by Dr. Kleiner.  She has not started on biologics yet.

## 2021-08-05 NOTE — ASSESSMENT
[FreeTextEntry1] : 45 y.o. F w/ recent onset RA and likely FM pain syndrome.  I spent most of today's office visit (35 min) discussing pathogenesis and further non-operative management.  Pt. is pending radiological work-up of her RA.  We will perform NCS b/l UE to r/o distal entrapment neuropathies (ie, CTS and UNE).  Nay consider diagnostic MSK US examination of right shoulder RC tendons to evaluate tendinosis/partial tear.  Rx P.T. for modalities, gentle ROM, stretching and strengthening exercises.  Discussed benefits of aerobic exercise and proper sleep hygiene as effective lifestyle modifications for patients w/ FM.  Pt. may benefit from nerve membrane stabilizing agent (ie, Lyrica) but she is on Adderall for ADHD and venlafaxine for depression.  I explained to patient that I do not Rx opioids for chronic MSK pain.  We discussed referral to chronic pain management but I feel her meds may be best managed by psychiatry.  Pt. is in agreement with plan.  All questions answered.  RTC for NCS.

## 2021-08-10 ENCOUNTER — NON-APPOINTMENT (OUTPATIENT)
Age: 46
End: 2021-08-10

## 2021-08-10 ENCOUNTER — APPOINTMENT (OUTPATIENT)
Dept: OBGYN | Facility: CLINIC | Age: 46
End: 2021-08-10
Payer: COMMERCIAL

## 2021-08-10 VITALS
DIASTOLIC BLOOD PRESSURE: 68 MMHG | SYSTOLIC BLOOD PRESSURE: 120 MMHG | WEIGHT: 167 LBS | BODY MASS INDEX: 27.82 KG/M2 | HEIGHT: 65 IN

## 2021-08-10 DIAGNOSIS — N92.1 EXCESSIVE AND FREQUENT MENSTRUATION WITH IRREGULAR CYCLE: ICD-10-CM

## 2021-08-10 DIAGNOSIS — Z30.9 ENCOUNTER FOR CONTRACEPTIVE MANAGEMENT, UNSPECIFIED: ICD-10-CM

## 2021-08-10 DIAGNOSIS — Z13.1 ENCOUNTER FOR SCREENING FOR DIABETES MELLITUS: ICD-10-CM

## 2021-08-10 DIAGNOSIS — Z11.4 ENCOUNTER FOR SCREENING FOR HUMAN IMMUNODEFICIENCY VIRUS [HIV]: ICD-10-CM

## 2021-08-10 DIAGNOSIS — Z12.4 ENCOUNTER FOR SCREENING FOR MALIGNANT NEOPLASM OF CERVIX: ICD-10-CM

## 2021-08-10 PROCEDURE — 99212 OFFICE O/P EST SF 10 MIN: CPT

## 2021-08-10 PROCEDURE — 36415 COLL VENOUS BLD VENIPUNCTURE: CPT

## 2021-08-12 ENCOUNTER — APPOINTMENT (OUTPATIENT)
Dept: RADIOLOGY | Facility: CLINIC | Age: 46
End: 2021-08-12

## 2021-08-12 ENCOUNTER — APPOINTMENT (OUTPATIENT)
Dept: MAMMOGRAPHY | Facility: CLINIC | Age: 46
End: 2021-08-12

## 2021-08-13 PROBLEM — Z13.1 SCREENING FOR DIABETES MELLITUS (DM): Status: RESOLVED | Noted: 2021-04-16 | Resolved: 2021-08-13

## 2021-08-13 NOTE — HISTORY OF PRESENT ILLNESS
[Currently Active] : currently active [Men] : men [No] : No [FreeTextEntry1] : Ms. Tirado 44 y/o presents today for STD testing and birth control consultation.\par \par She is using condoms as contraception, but desires contraception. \par \par She reports having bad pain when on her menses.\par  [TextBox_4] : STD screen BC  consultation [PapSmeardate] : 3/22/21 [TextBox_31] : negative

## 2021-08-13 NOTE — PHYSICAL EXAM
Progress Note: General Surgery  Patient: HERIBERTO RODRIGUEZ , 77y (1941)Male   MRN: [id number]  Location[address]  Visit: 05-21-19 Inpatient  Date: 05-23-19 @ 05:44    Procedure/Diagnosis: CBD obstruction    Events/ 24h: No acute events overnight. Pain controlled.    Vitals: T(F): 96 (05-23-19 @ 04:57), Max: 97.1 (05-22-19 @ 21:24)  HR: 69 (05-23-19 @ 04:57)  BP: 128/64 (05-23-19 @ 04:57) (117/62 - 130/62)  RR: 18 (05-23-19 @ 04:57)  SpO2: 94% (05-22-19 @ 19:31)    In:   05-21-19 @ 07:01  -  05-22-19 @ 07:00  --------------------------------------------------------  IN: 0 mL      Out:   05-21-19 @ 07:01  -  05-22-19 @ 07:00  --------------------------------------------------------  OUT:  Total OUT: 0 mL        Net:   05-21-19 @ 07:01  -  05-22-19 @ 07:00  --------------------------------------------------------  NET: 0 mL        Diet: Diet, Regular:   Low Fat (LOWFAT) (05-22-19 @ 10:55)  Diet, NPO after Midnight:      NPO Start Date: 22-May-2019,   NPO Start Time: 23:59 (05-22-19 @ 20:37)    IV Fluids: calcium carbonate 1250 mG  + Vitamin D (OsCal 500 + D) 1 Tablet(s) Oral daily      Physical Examination:  General Appearance: NAD  HEENT: EOMI, sclera non-icteric.  Heart: RRR   Lungs: CTABL.   Abdomen:  Soft, nontender, nondistended.   MSK/Extremities: Warm & well-perfused.   Skin: Warm, dry. No jaundice.       Medications: [Standing]  aspirin  chewable 81 milliGRAM(s) Oral daily  buDESOnide 160 MICROgram(s)/formoterol 4.5 MICROgram(s) Inhaler 2 Puff(s) Inhalation two times a day  calcium carbonate 1250 mG  + Vitamin D (OsCal 500 + D) 1 Tablet(s) Oral daily  chlorhexidine 4% Liquid 1 Application(s) Topical <User Schedule>  enoxaparin Injectable 40 milliGRAM(s) SubCutaneous at bedtime  isosorbide   mononitrate ER Tablet (IMDUR) 30 milliGRAM(s) Oral daily  levothyroxine 137 MICROGram(s) Oral daily  metoprolol succinate ER 50 milliGRAM(s) Oral daily  montelukast 10 milliGRAM(s) Oral daily  pantoprazole    Tablet 40 milliGRAM(s) Oral every 12 hours  valsartan 160 milliGRAM(s) Oral daily    DVT Prophylaxis: enoxaparin Injectable 40 milliGRAM(s) SubCutaneous at bedtime    GI Prophylaxis: pantoprazole    Tablet 40 milliGRAM(s) Oral every 12 hours    Antibiotics:   Anticoagulation:   Medications:[PRN]      Labs:                        11.5   3.19  )-----------( 157      ( 22 May 2019 05:16 )             35.1     05-22    139  |  100  |  15  ----------------------------<  84  3.6   |  27  |  0.9    Ca    8.3<L>      22 May 2019 05:16  Mg     1.7     05-22    TPro  5.8<L>  /  Alb  3.2<L>  /  TBili  0.6  /  DBili  0.3<H>  /  AST  50<H>  /  ALT  109<H>  /  AlkPhos  523<H>  05-22    LIVER FUNCTIONS - ( 22 May 2019 11:19 )  Alb: x     / Pro: x     / ALK PHOS: x     / ALT: x     / AST: x     / GGT: 438 U/L       PT/INR - ( 22 May 2019 05:16 )   PT: 12.90 sec;   INR: 1.12 ratio         PTT - ( 22 May 2019 05:16 )  PTT:38.0 sec    CARDIAC MARKERS ( 21 May 2019 13:41 )  x     / <0.01 ng/mL / 86 U/L / x     / x          Imaging:    < from: US Abdomen Limited (05.21.19 @ 17:47) >  IMPRESSION:    1.  Unremarkable liver without evidence for biliary ductal dilation.    2.  No cholelithiasis or sonographic evidence for cholecystitis.    < end of copied text >    < from: MR Abdomen No Cont (05.06.19 @ 19:48) >  IMPRESSION:    1.  No biliary ductal dilatation or evidence of choledocholithiasis.    2.   A 1.7 cm cystic structure within the pancreatic body, possibly a   serous cystadenoma.    < end of copied text >      < from: CT Abdomen and Pelvis w/ IV Cont (05.05.19 @ 18:44) >  No CT evidence of acute intra-abdominal or pelvic pathology.    Mild dilatation of the distal CBD up to 1.0 cm without evidence of   choledocholithiasis. Further evaluation with nonemergent MRI/MRCP can be   obtained for further evaluation if clinically warranted.    < end of copied text >    Assessment:  77y Male patient admitted w/ CBD obstruction    Plan:    GI: no need for MRCP, needs ERCP outpt  NPO, IVF  F/u LFTs  DVT/GI ppx  OOBAT  IS  Pain control    Date/Time: 05-23-19 @ 05:44 [Labia Majora] : normal [Labia Minora] : normal [Discharge] : a  ~M vaginal discharge was present [No Bleeding] : There was no active vaginal bleeding [Normal] : normal [Uterine Adnexae] : normal [Appropriately responsive] : appropriately responsive [Alert] : alert [No Acute Distress] : no acute distress [Oriented x3] : oriented x3

## 2021-08-13 NOTE — END OF VISIT
[FreeTextEntry3] : I, [Jordy Morrison] solely acted as scribe for Dr. Santa Paige on 08/10/2021 \par All medical entries made by the Scribe were at my, Dr. Paige’s, direction and personally\par dictated by me on 08/10/2021 . I have reviewed the chart and agree that the record\par accurately reflects my personal performance of the history, physical exam, assessment and plan. I\par have also personally directed, reviewed, and agreed with the chart.

## 2021-08-13 NOTE — DISCUSSION/SUMMARY
[FreeTextEntry1] : Contraceptive options discussed along with the respective effectiveness, risks, benefits, and side effects. The options discussed included expectant management, condom use, OCP's, nuvaring, nexplanon, IUD, vasectomy, bilateral salpingectomies. Questions were answered. She desires\par   \par Mirena IUD insertion was discussed. Benefits were discussed. Premedication instructions were given.\par \par Affirm and blood work collected today for STI testing and hormone panel.\par \par Follow up for sonogram and biopsy before Mirena IUD insertion.

## 2021-08-14 LAB
BASOPHILS # BLD AUTO: 0.06 K/UL
BASOPHILS NFR BLD AUTO: 0.9 %
C TRACH RRNA SPEC QL NAA+PROBE: NOT DETECTED
EOSINOPHIL # BLD AUTO: 0.19 K/UL
EOSINOPHIL NFR BLD AUTO: 2.8 %
HAV IGM SER QL: NONREACTIVE
HBV CORE IGM SER QL: NONREACTIVE
HBV SURFACE AG SER QL: NONREACTIVE
HCT VFR BLD CALC: 43.2 %
HCV AB SER QL: NONREACTIVE
HCV S/CO RATIO: 0.12 S/CO
HGB BLD-MCNC: 14 G/DL
HIV1+2 AB SPEC QL IA.RAPID: NONREACTIVE
IMM GRANULOCYTES NFR BLD AUTO: 0.1 %
LH SERPL-ACNC: 25.5 IU/L
LYMPHOCYTES # BLD AUTO: 1.99 K/UL
LYMPHOCYTES NFR BLD AUTO: 29.8 %
MAN DIFF?: NORMAL
MCHC RBC-ENTMCNC: 30 PG
MCHC RBC-ENTMCNC: 32.4 GM/DL
MCV RBC AUTO: 92.5 FL
MONOCYTES # BLD AUTO: 0.65 K/UL
MONOCYTES NFR BLD AUTO: 9.7 %
N GONORRHOEA RRNA SPEC QL NAA+PROBE: NOT DETECTED
NEUTROPHILS # BLD AUTO: 3.77 K/UL
NEUTROPHILS NFR BLD AUTO: 56.7 %
PLATELET # BLD AUTO: 318 K/UL
PROLACTIN SERPL-MCNC: 9.9 NG/ML
RBC # BLD: 4.67 M/UL
RBC # FLD: 13.6 %
SOURCE AMPLIFICATION: NORMAL
T PALLIDUM AB SER QL IA: NEGATIVE
TSH SERPL-ACNC: 2.04 UIU/ML
WBC # FLD AUTO: 6.67 K/UL

## 2021-08-17 ENCOUNTER — APPOINTMENT (OUTPATIENT)
Dept: RHEUMATOLOGY | Facility: CLINIC | Age: 46
End: 2021-08-17

## 2021-08-24 ENCOUNTER — APPOINTMENT (OUTPATIENT)
Dept: FAMILY MEDICINE | Facility: CLINIC | Age: 46
End: 2021-08-24

## 2021-09-01 ENCOUNTER — APPOINTMENT (OUTPATIENT)
Dept: FAMILY MEDICINE | Facility: CLINIC | Age: 46
End: 2021-09-01

## 2021-09-01 DIAGNOSIS — Z86.2 PERSONAL HISTORY OF DISEASES OF THE BLOOD AND BLOOD-FORMING ORGANS AND CERTAIN DISORDERS INVOLVING THE IMMUNE MECHANISM: ICD-10-CM

## 2021-09-03 ENCOUNTER — APPOINTMENT (OUTPATIENT)
Dept: OBGYN | Facility: CLINIC | Age: 46
End: 2021-09-03

## 2021-09-09 ENCOUNTER — APPOINTMENT (OUTPATIENT)
Dept: FAMILY MEDICINE | Facility: CLINIC | Age: 46
End: 2021-09-09

## 2021-10-06 LAB
CANDIDA VAG CYTO: NOT DETECTED
FSH SERPL-MCNC: 47.3 IU/L
G VAGINALIS+PREV SP MTYP VAG QL MICRO: DETECTED
T VAGINALIS VAG QL WET PREP: NOT DETECTED

## 2021-10-08 ENCOUNTER — APPOINTMENT (OUTPATIENT)
Dept: OBGYN | Facility: CLINIC | Age: 46
End: 2021-10-08
Payer: COMMERCIAL

## 2021-10-08 ENCOUNTER — APPOINTMENT (OUTPATIENT)
Dept: FAMILY MEDICINE | Facility: CLINIC | Age: 46
End: 2021-10-08
Payer: COMMERCIAL

## 2021-10-08 ENCOUNTER — ASOB RESULT (OUTPATIENT)
Age: 46
End: 2021-10-08

## 2021-10-08 VITALS — SYSTOLIC BLOOD PRESSURE: 126 MMHG | DIASTOLIC BLOOD PRESSURE: 80 MMHG

## 2021-10-08 VITALS
OXYGEN SATURATION: 98 % | HEART RATE: 88 BPM | BODY MASS INDEX: 28.51 KG/M2 | TEMPERATURE: 97.8 F | SYSTOLIC BLOOD PRESSURE: 134 MMHG | WEIGHT: 171.1 LBS | HEIGHT: 65 IN | DIASTOLIC BLOOD PRESSURE: 86 MMHG

## 2021-10-08 VITALS
BODY MASS INDEX: 28.32 KG/M2 | DIASTOLIC BLOOD PRESSURE: 90 MMHG | WEIGHT: 170 LBS | SYSTOLIC BLOOD PRESSURE: 136 MMHG | HEIGHT: 65 IN

## 2021-10-08 DIAGNOSIS — M54.2 CERVICALGIA: ICD-10-CM

## 2021-10-08 LAB
HCG UR QL: NEGATIVE
QUALITY CONTROL: YES

## 2021-10-08 PROCEDURE — 81025 URINE PREGNANCY TEST: CPT

## 2021-10-08 PROCEDURE — 99214 OFFICE O/P EST MOD 30 MIN: CPT | Mod: 25

## 2021-10-08 PROCEDURE — 76830 TRANSVAGINAL US NON-OB: CPT

## 2021-10-08 PROCEDURE — 99214 OFFICE O/P EST MOD 30 MIN: CPT

## 2021-10-08 NOTE — HISTORY OF PRESENT ILLNESS
[N] : Patient reports normal menses [Menarche Age: ____] : age at menarche was [unfilled] [Currently Active] : currently active [Men] : men [Vaginal] : vaginal [No] : No [Patient refuses STI testing] : Patient refuses STI testing [Condoms] : uses condoms [Y] : Patient is sexually active [Mammogramdate] : 01/16/18 [TextBox_19] : BR1  [PapSmeardate] : 03/22/21 [TextBox_31] : NEG [HPVDate] : 03/22/21 [TextBox_78] : NEG [PGHxTotal] : 2 [LMPDate] : 09/05/21 [[facility] : 2 [[facility] : 2 [FreeTextEntry1] : 09/05/21
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
## 2021-10-08 NOTE — PROCEDURE
[Endometrial Biopsy] : Endometrial biopsy [Time out performed] : Pre-procedure time out performed.  Patient's name, date of birth and procedure confirmed. [Consent Obtained] : Consent obtained [Irregular Bleeding] : irregular uterine bleeding [Risks] : risks [Benefits] : benefits [Alternatives] : alternatives [Patient] : patient [Infection] : infection [Bleeding] : bleeding [Allergic Reaction] : allergic reaction [Uterine Perforation] : uterine perforation [Negative] : negative pregnancy test [No Premedication] : No premedication [Betadine] : Betadine [None] : none [Tenaculum] : Tenaculum [Required Dilation] : required dilation [Tolerated Well] : Patient tolerated the procedure well [No Complications] : No complications [LMPDate] : 10/05/2021 [de-identified] : Stenotic internal cervical os- unable to dilate internal cervical os. [de-identified] : Unable to obtain EMB due to stenotic cervix. Patient very uncomfortable during procedure.

## 2021-10-08 NOTE — PHYSICAL EXAM
[Normal] : affect was normal and insight and judgment were intact [de-identified] : neck ROM limited 2/2 pain, nontender, sensation intact, motor strength limited 2/2 to pain, lhermittes and spurling negative
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
## 2021-10-08 NOTE — DISCUSSION/SUMMARY
[FreeTextEntry1] : -Abnormal uterine bleeding with intermenstrual bleeding-\par 1) AUB labs from 8/10/21 were reviewed and significant for elevated FSH. Results were discussed.\par 2) Pelvic sono today: Mobile uterus. Anteverted uterus initially, then retroverted at end of exam. C/S scar visualized. EMS: 9.6 mm. Right ovary with 2 simple cysts measuring 1.6 and 1.9 cm. Normal appearing left ovary. Results were discussed. Will repeat pelvic sono in 3 months to re-assess ovarian cysts.\par 3) We discussed endometrial sampling to r/o endometrial hyperplasia/cancer. Hysteroscopy and endometrial biopsy was discussed. Procedure details, r/b/a were discussed. Risks discussed include but are not limited to pain, bleeding, infection, uterine perforation and injury to nearby organs. Consent was signed. Unable to perform hysteroscopy with EMB today due to stenotic internal cervical os and patient being very uncomfortable during dilation of cervix. We discussed her options includin) Taking Cytotec pre-procedure and re-scheduling hysteroscopy with EMB in the office without anesthesia, or 2) Scheduling D&C hysteroscopy with anesthesia at [facility]. Patient desires to schedule her procedure under anesthesia. Task sent for scheduling. She was also given our surgical scheduler, Stacie's contact information. \par \par -Hot flashes- Recommended wearing loose fitting clothing, using fans, and keeping room temperatures cool.\par \par -Family history of breast cancer in her mother and maternal aunt- Her last screening mammogram was in 2018: BIRADs 1.\par 1) She is due for her screening mammogram and breast ultrasound. Recommended scheduling her breast imaging soon. \par 2) We discussed the availability and utility of genetic screening. Literature on Color Genetic Screening was provided. We discussed the nature of this information and the possible need for further evaluation based on the results. We reviewed the fact that some insurance companies will cover the cost of testing and some will not.\par \par -h/o anxiety and depression- She is currently on Venlafaxine and seeing a therapist. She is not currently seeing Psychiatry. She reports feeling more anxiety recently. Recommended that she follow up with Psychiatry soon.\par \par -She will be scheduled for D&C hysteroscopy. For repeat pelvic sono in 3 months to re-assess ovarian cyst.\par \par All questions and concerns were discussed.\par

## 2021-10-15 ENCOUNTER — APPOINTMENT (OUTPATIENT)
Dept: PHYSICAL MEDICINE AND REHAB | Facility: CLINIC | Age: 46
End: 2021-10-15

## 2021-10-22 ENCOUNTER — APPOINTMENT (OUTPATIENT)
Dept: OBGYN | Facility: CLINIC | Age: 46
End: 2021-10-22

## 2021-10-25 ENCOUNTER — APPOINTMENT (OUTPATIENT)
Dept: OBGYN | Facility: CLINIC | Age: 46
End: 2021-10-25
Payer: COMMERCIAL

## 2021-10-25 VITALS
SYSTOLIC BLOOD PRESSURE: 124 MMHG | BODY MASS INDEX: 29.2 KG/M2 | DIASTOLIC BLOOD PRESSURE: 70 MMHG | HEIGHT: 65 IN | WEIGHT: 175.25 LBS

## 2021-10-25 DIAGNOSIS — N95.1 MENOPAUSAL AND FEMALE CLIMACTERIC STATES: ICD-10-CM

## 2021-10-25 PROCEDURE — 99214 OFFICE O/P EST MOD 30 MIN: CPT

## 2021-10-25 NOTE — COUNSELING
[Preconception Care/ Fertility options] : preconception care, fertility options [Lab Results] : lab results [Pre/Post Op Instructions] : pre/post op instructions

## 2021-10-25 NOTE — HISTORY OF PRESENT ILLNESS
[Patient reported PAP Smear was normal] : Patient reported PAP Smear was normal [Gonorrhea test offered] : Gonorrhea test offered [Chlamydia test offered] : Chlamydia test offered [HPV test offered] : HPV test offered [perimenopausal] : perimenopausal [N] : Patient does not use contraception [Monogamous (Male Partner)] : is monogamous with a male partner [Y] : Positive pregnancy history [Menarche Age: ____] : age at menarche was [unfilled] [Currently Active] : currently active [Men] : men [Vaginal] : vaginal [No] : No [Patient refuses STI testing] : Patient refuses STI testing [Mammogramdate] : 01.16.18 [TextBox_19] : BR1 [PapSmeardate] : 03.22.21 [GonorrheaDate] : 08.10.21 [TextBox_63] : NEG [ChlamydiaDate] : 08.10.21 [TextBox_68] : NEG [HPVDate] : 03.22.21 [TextBox_78] : NEG [LMPDate] : 10.06.21 [PGHxTotal] : 2 [[facility] : [facility] [[facility]xLiving] : 2 [FreeTextEntry1] : 10.06.21

## 2021-10-25 NOTE — DISCUSSION/SUMMARY
[FreeTextEntry1] : We reviewed her history of AUB and cervical stenosis. I explained that endometrial sampling is indicated for AUB especially over the age of 45 but that my overall suspicion for endometrial malignancy was low. We reviewed the planned procedure and the risk of uterine perforation. \par I offered her a Mirena IUD to be placed at the time of D&C and she is considering. We reviewed the benefit of Mirena IUD to prevent further bleeding and that it is also a treatment for hyperplasia should it be encountered. \par In regards to perimenopausal symptoms we briefly discussed medical and lifestyle modifications. She is already taking effexor 75 daily and is still having hot flashes and mood swings.\par We reviewed that her FSH was 47. At this time, I explained that spontaneous conception would be virtually unheard of. If she desires fertility I explained that this would entail IVF with a donor egg. She explains that although she desires another child she does not want to use someone else's egg. If she decides to pursue ART, she will reach out to our office for a referral.

## 2021-10-26 ENCOUNTER — APPOINTMENT (OUTPATIENT)
Dept: DISASTER EMERGENCY | Facility: CLINIC | Age: 46
End: 2021-10-26

## 2021-10-26 ENCOUNTER — APPOINTMENT (OUTPATIENT)
Dept: RHEUMATOLOGY | Facility: CLINIC | Age: 46
End: 2021-10-26

## 2021-10-27 ENCOUNTER — OUTPATIENT (OUTPATIENT)
Dept: OUTPATIENT SERVICES | Facility: HOSPITAL | Age: 46
LOS: 1 days | End: 2021-10-27
Payer: COMMERCIAL

## 2021-10-27 VITALS
TEMPERATURE: 98 F | HEIGHT: 64 IN | HEART RATE: 72 BPM | WEIGHT: 171.96 LBS | SYSTOLIC BLOOD PRESSURE: 123 MMHG | RESPIRATION RATE: 16 BRPM | OXYGEN SATURATION: 100 % | DIASTOLIC BLOOD PRESSURE: 85 MMHG

## 2021-10-27 DIAGNOSIS — N93.9 ABNORMAL UTERINE AND VAGINAL BLEEDING, UNSPECIFIED: ICD-10-CM

## 2021-10-27 DIAGNOSIS — Z98.891 HISTORY OF UTERINE SCAR FROM PREVIOUS SURGERY: Chronic | ICD-10-CM

## 2021-10-27 DIAGNOSIS — Z01.818 ENCOUNTER FOR OTHER PREPROCEDURAL EXAMINATION: ICD-10-CM

## 2021-10-27 LAB
ABO RH CONFIRMATION: SIGNIFICANT CHANGE UP
ADD ON TEST-SPECIMEN IN LAB: SIGNIFICANT CHANGE UP
ANION GAP SERPL CALC-SCNC: 5 MMOL/L — SIGNIFICANT CHANGE UP (ref 5–17)
APPEARANCE UR: CLEAR — SIGNIFICANT CHANGE UP
BASOPHILS # BLD AUTO: 0.05 K/UL — SIGNIFICANT CHANGE UP (ref 0–0.2)
BASOPHILS NFR BLD AUTO: 0.7 % — SIGNIFICANT CHANGE UP (ref 0–2)
BILIRUB UR-MCNC: NEGATIVE — SIGNIFICANT CHANGE UP
BUN SERPL-MCNC: 16 MG/DL — SIGNIFICANT CHANGE UP (ref 7–23)
CALCIUM SERPL-MCNC: 8.6 MG/DL — SIGNIFICANT CHANGE UP (ref 8.5–10.1)
CHLORIDE SERPL-SCNC: 111 MMOL/L — HIGH (ref 96–108)
CO2 SERPL-SCNC: 25 MMOL/L — SIGNIFICANT CHANGE UP (ref 22–31)
COLOR SPEC: YELLOW — SIGNIFICANT CHANGE UP
CREAT SERPL-MCNC: 0.6 MG/DL — SIGNIFICANT CHANGE UP (ref 0.5–1.3)
DIFF PNL FLD: NEGATIVE — SIGNIFICANT CHANGE UP
EOSINOPHIL # BLD AUTO: 0.14 K/UL — SIGNIFICANT CHANGE UP (ref 0–0.5)
EOSINOPHIL NFR BLD AUTO: 1.8 % — SIGNIFICANT CHANGE UP (ref 0–6)
GLUCOSE SERPL-MCNC: 95 MG/DL — SIGNIFICANT CHANGE UP (ref 70–99)
GLUCOSE UR QL: NEGATIVE MG/DL — SIGNIFICANT CHANGE UP
HCT VFR BLD CALC: 43.8 % — SIGNIFICANT CHANGE UP (ref 34.5–45)
HGB BLD-MCNC: 13.9 G/DL — SIGNIFICANT CHANGE UP (ref 11.5–15.5)
IMM GRANULOCYTES NFR BLD AUTO: 0.3 % — SIGNIFICANT CHANGE UP (ref 0–1.5)
KETONES UR-MCNC: NEGATIVE — SIGNIFICANT CHANGE UP
LEUKOCYTE ESTERASE UR-ACNC: NEGATIVE — SIGNIFICANT CHANGE UP
LYMPHOCYTES # BLD AUTO: 1.59 K/UL — SIGNIFICANT CHANGE UP (ref 1–3.3)
LYMPHOCYTES # BLD AUTO: 20.8 % — SIGNIFICANT CHANGE UP (ref 13–44)
MCHC RBC-ENTMCNC: 29.6 PG — SIGNIFICANT CHANGE UP (ref 27–34)
MCHC RBC-ENTMCNC: 31.7 GM/DL — LOW (ref 32–36)
MCV RBC AUTO: 93.2 FL — SIGNIFICANT CHANGE UP (ref 80–100)
MONOCYTES # BLD AUTO: 0.63 K/UL — SIGNIFICANT CHANGE UP (ref 0–0.9)
MONOCYTES NFR BLD AUTO: 8.2 % — SIGNIFICANT CHANGE UP (ref 2–14)
NEUTROPHILS # BLD AUTO: 5.21 K/UL — SIGNIFICANT CHANGE UP (ref 1.8–7.4)
NEUTROPHILS NFR BLD AUTO: 68.2 % — SIGNIFICANT CHANGE UP (ref 43–77)
NITRITE UR-MCNC: POSITIVE
PH UR: 6 — SIGNIFICANT CHANGE UP (ref 5–8)
PLATELET # BLD AUTO: 297 K/UL — SIGNIFICANT CHANGE UP (ref 150–400)
POTASSIUM SERPL-MCNC: 4.2 MMOL/L — SIGNIFICANT CHANGE UP (ref 3.5–5.3)
POTASSIUM SERPL-SCNC: 4.2 MMOL/L — SIGNIFICANT CHANGE UP (ref 3.5–5.3)
PROT UR-MCNC: NEGATIVE MG/DL — SIGNIFICANT CHANGE UP
RBC # BLD: 4.7 M/UL — SIGNIFICANT CHANGE UP (ref 3.8–5.2)
RBC # FLD: 13.5 % — SIGNIFICANT CHANGE UP (ref 10.3–14.5)
SODIUM SERPL-SCNC: 141 MMOL/L — SIGNIFICANT CHANGE UP (ref 135–145)
SP GR SPEC: 1.02 — SIGNIFICANT CHANGE UP (ref 1.01–1.02)
UROBILINOGEN FLD QL: NEGATIVE MG/DL — SIGNIFICANT CHANGE UP
WBC # BLD: 7.64 K/UL — SIGNIFICANT CHANGE UP (ref 3.8–10.5)
WBC # FLD AUTO: 7.64 K/UL — SIGNIFICANT CHANGE UP (ref 3.8–10.5)

## 2021-10-27 PROCEDURE — G0463: CPT | Mod: 25

## 2021-10-27 PROCEDURE — 36415 COLL VENOUS BLD VENIPUNCTURE: CPT

## 2021-10-27 PROCEDURE — 86850 RBC ANTIBODY SCREEN: CPT

## 2021-10-27 PROCEDURE — 86901 BLOOD TYPING SEROLOGIC RH(D): CPT

## 2021-10-27 PROCEDURE — 80076 HEPATIC FUNCTION PANEL: CPT

## 2021-10-27 PROCEDURE — 85025 COMPLETE CBC W/AUTO DIFF WBC: CPT

## 2021-10-27 PROCEDURE — 86900 BLOOD TYPING SEROLOGIC ABO: CPT

## 2021-10-27 PROCEDURE — 81001 URINALYSIS AUTO W/SCOPE: CPT

## 2021-10-27 PROCEDURE — 80048 BASIC METABOLIC PNL TOTAL CA: CPT

## 2021-10-27 NOTE — H&P PST ADULT - NSICDXPASTMEDICALHX_GEN_ALL_CORE_FT
PAST MEDICAL HISTORY:  Abnormal uterine bleeding     Anxiety and depression     Asthma controlled never intubated    COVID-19 vaccine series not completed Pfizer first dose :  May 2021    COVID-19 virus infection November 2020    Daily consumption of alcohol 2 glasses/day    Heart burn     Rheumatoid arthritis

## 2021-10-27 NOTE — H&P PST ADULT - ASSESSMENT
Plan:  1. PST instructions given ; NPO status instructions to be given by ASU   2. Pt instructed to take following meds with sip of water :   3. Pt instructed to take routine evening medications unless indicated   4. Stop NSAIDS ( Aspirin Alev Motrin Mobic Diclofenac), herbal supplements , MVI , Vitamin fish oil 7 days prior to surgery  unless   directed by surgeon or cardiologist;   5. Medical Optimization  with    6. EZ wash instructions given & mupirocin instructions given  7. Labs EKG CXR as per surgeon request   8. Pt instructed to self quarantine after Covid test   9. Covid Testing scheduled Pt notified and aware  10. Pt denies covid symptoms shortness of breath fever cough    46 year old female with abnormal uterine bleeding c/0 irregular periods and pelvic pain; denies change in bowel habits; she presents to PST for planned D&C hysteroscopy     Plan:  1. PST instructions given ; NPO status instructions to be given by ASU   2. Pt instructed to take following meds with sip of water : proair prn  3. Pt instructed to take routine evening medications unless indicated   4. Stop NSAIDS ( Aspirin Alev Motrin Mobic Diclofenac), herbal supplements , MVI , Vitamin fish oil 7 days prior to surgery  unless   directed by surgeon or cardiologist;   5. Medical Optimization  not indicated  6. Urine for pregnancy on day of surgery   7. Labs  as per surgeon request   8. Pt instructed to self quarantine after Covid test   9. Covid Testing scheduled Pt notified and aware  10. Pt denies covid symptoms shortness of breath fever cough

## 2021-10-27 NOTE — H&P PST ADULT - NSICDXFAMILYHX_GEN_ALL_CORE_FT
FAMILY HISTORY:  Father  Still living? Unknown  FH: coronary artery disease, Age at diagnosis: Age Unknown  FH: diabetes mellitus, Age at diagnosis: Age Unknown

## 2021-10-27 NOTE — H&P PST ADULT - HISTORY OF PRESENT ILLNESS
46 year old female with abnormal uterine bleeding c/0 irregular periods and pelvic pain; denies change in bowel habits; she presents to PST for planned D&C hysteroscopy

## 2021-10-27 NOTE — H&P PST ADULT - NSANTHOSAYNRD_GEN_A_CORE
No. LUTHER screening performed.  STOP BANG Legend: 0-2 = LOW Risk; 3-4 = INTERMEDIATE Risk; 5-8 = HIGH Risk

## 2021-10-27 NOTE — H&P PST ADULT - HEALTH CARE MAINTENANCE
Pt denies travel out of [facility] [address] for the past 14 days Pt denies  travel internationally for the past 21 days

## 2021-10-28 ENCOUNTER — APPOINTMENT (OUTPATIENT)
Dept: FAMILY MEDICINE | Facility: CLINIC | Age: 46
End: 2021-10-28
Payer: COMMERCIAL

## 2021-10-28 ENCOUNTER — NON-APPOINTMENT (OUTPATIENT)
Age: 46
End: 2021-10-28

## 2021-10-28 VITALS
TEMPERATURE: 98 F | HEIGHT: 65 IN | DIASTOLIC BLOOD PRESSURE: 84 MMHG | WEIGHT: 175 LBS | HEART RATE: 76 BPM | BODY MASS INDEX: 29.16 KG/M2 | OXYGEN SATURATION: 99 % | SYSTOLIC BLOOD PRESSURE: 122 MMHG

## 2021-10-28 DIAGNOSIS — Z01.818 ENCOUNTER FOR OTHER PREPROCEDURAL EXAMINATION: ICD-10-CM

## 2021-10-28 DIAGNOSIS — N93.9 ABNORMAL UTERINE AND VAGINAL BLEEDING, UNSPECIFIED: ICD-10-CM

## 2021-10-28 LAB — SARS-COV-2 N GENE NPH QL NAA+PROBE: NOT DETECTED

## 2021-10-28 PROCEDURE — 99214 OFFICE O/P EST MOD 30 MIN: CPT | Mod: 25

## 2021-10-28 PROCEDURE — 93000 ELECTROCARDIOGRAM COMPLETE: CPT

## 2021-10-28 RX ORDER — OXYCODONE HYDROCHLORIDE 5 MG/1
5 TABLET ORAL ONCE
Refills: 0 | Status: DISCONTINUED | OUTPATIENT
Start: 2021-10-29 | End: 2021-10-29

## 2021-10-28 RX ORDER — SODIUM CHLORIDE 9 MG/ML
1000 INJECTION, SOLUTION INTRAVENOUS
Refills: 0 | Status: DISCONTINUED | OUTPATIENT
Start: 2021-10-29 | End: 2021-10-29

## 2021-10-28 RX ORDER — ONDANSETRON 8 MG/1
4 TABLET, FILM COATED ORAL EVERY 6 HOURS
Refills: 0 | Status: DISCONTINUED | OUTPATIENT
Start: 2021-10-29 | End: 2021-10-29

## 2021-10-28 RX ORDER — FENTANYL CITRATE 50 UG/ML
50 INJECTION INTRAVENOUS
Refills: 0 | Status: DISCONTINUED | OUTPATIENT
Start: 2021-10-29 | End: 2021-10-29

## 2021-10-28 RX ORDER — METHOCARBAMOL 750 MG/1
750 TABLET, FILM COATED ORAL
Qty: 28 | Refills: 0 | Status: DISCONTINUED | COMMUNITY
Start: 2021-10-08 | End: 2021-10-28

## 2021-10-28 NOTE — ASSESSMENT
[High Risk Surgery - Intraperitoneal, Intrathoracic or Supringuinal Vascular Procedures] : High Risk Surgery - Intraperitoneal, Intrathoracic or Supringuinal Vascular Procedures - No (0) [Ischemic Heart Disease] : Ischemic Heart Disease - No (0) [Congestive Heart Failure] : Congestive Heart Failure - No (0) [Prior Cerebrovascular Accident or TIA] : Prior Cerebrovascular Accident or TIA - No (0) [Creatinine >= 2mg/dL (1 Point)] : Creatinine >= 2mg/dL - No (0) [Insulin-dependent Diabetic (1 Point)] : Insulin-dependent Diabetic - No (0) [0] : 0 , RCRI Class: I, Risk of Post-Op Cardiac Complications: 3.9%, 95% CI for Risk Estimate: 2.8% - 5.4% [Patient Optimized for Surgery] : Patient optimized for surgery [FreeTextEntry4] : 45 yo female with inflammatory arthritis presents for pre-op examination for D&C Hysteroscopy on 10/29/21 with Dr. Garcia medically optimized for procedure.

## 2021-10-28 NOTE — PLAN
[FreeTextEntry1] : AUB: no active bleeding, may be related to menopause, f/u GYN for hysteroscopy\par Mild intermittent asthma: c/w albuterol hfa/nebulizer prn for sob/wheezing, 2 -3  exacerbations per year, advised to go to ER if condition worsens\par RTC 4 wks

## 2021-10-28 NOTE — HISTORY OF PRESENT ILLNESS
[No Pertinent Cardiac History] : no history of aortic stenosis, atrial fibrillation, coronary artery disease, recent myocardial infarction, or implantable device/pacemaker [No Adverse Anesthesia Reaction] : no adverse anesthesia reaction in self or family member [(Patient denies any chest pain, claudication, dyspnea on exertion, orthopnea, palpitations or syncope)] : Patient denies any chest pain, claudication, dyspnea on exertion, orthopnea, palpitations or syncope [Good (7-10 METs)] : Good (7-10 METs) [Asthma] : asthma [COPD] : no COPD [Sleep Apnea] : no sleep apnea [Smoker] : not a smoker [Chronic Anticoagulation] : no chronic anticoagulation [Chronic Kidney Disease] : no chronic kidney disease [Diabetes] : no diabetes [FreeTextEntry1] : D&C Hysteroscopy  [FreeTextEntry2] : 10/29/21 [FreeTextEntry3] : Dr. Garcia [FreeTextEntry4] : 45 yo female with inflammatory arthritis presents for pre-op examination for D&C Hysteroscopy on 10/29/21 with Dr. Garcia. Pt was having AUB for which she is having a hysteroscopy with biopsy. Denies fever, chills, cp, palpitations, sob, nv, heat/cold intolerance, dizziness, melena, hematochezia, muscle weakness, loss of sensation, bowel/bladder incontinence or calf pain.\par  [FreeTextEntry7] : EKG without ST/T wave abnormalities

## 2021-10-29 ENCOUNTER — OUTPATIENT (OUTPATIENT)
Dept: INPATIENT UNIT | Facility: HOSPITAL | Age: 46
LOS: 1 days | Discharge: HOME | End: 2021-10-29
Payer: COMMERCIAL

## 2021-10-29 ENCOUNTER — RESULT REVIEW (OUTPATIENT)
Age: 46
End: 2021-10-29

## 2021-10-29 ENCOUNTER — APPOINTMENT (OUTPATIENT)
Dept: OBGYN | Facility: HOSPITAL | Age: 46
End: 2021-10-29

## 2021-10-29 VITALS
RESPIRATION RATE: 16 BRPM | OXYGEN SATURATION: 99 % | HEART RATE: 72 BPM | SYSTOLIC BLOOD PRESSURE: 116 MMHG | DIASTOLIC BLOOD PRESSURE: 80 MMHG

## 2021-10-29 VITALS
RESPIRATION RATE: 16 BRPM | SYSTOLIC BLOOD PRESSURE: 146 MMHG | DIASTOLIC BLOOD PRESSURE: 96 MMHG | WEIGHT: 160.06 LBS | HEART RATE: 75 BPM | OXYGEN SATURATION: 99 % | TEMPERATURE: 98 F | HEIGHT: 64 IN

## 2021-10-29 DIAGNOSIS — N93.9 ABNORMAL UTERINE AND VAGINAL BLEEDING, UNSPECIFIED: ICD-10-CM

## 2021-10-29 DIAGNOSIS — N71.1 CHRONIC INFLAMMATORY DISEASE OF UTERUS: ICD-10-CM

## 2021-10-29 DIAGNOSIS — Z98.891 HISTORY OF UTERINE SCAR FROM PREVIOUS SURGERY: Chronic | ICD-10-CM

## 2021-10-29 DIAGNOSIS — M06.9 RHEUMATOID ARTHRITIS, UNSPECIFIED: ICD-10-CM

## 2021-10-29 DIAGNOSIS — F17.210 NICOTINE DEPENDENCE, CIGARETTES, UNCOMPLICATED: ICD-10-CM

## 2021-10-29 DIAGNOSIS — F41.9 ANXIETY DISORDER, UNSPECIFIED: ICD-10-CM

## 2021-10-29 DIAGNOSIS — N72 INFLAMMATORY DISEASE OF CERVIX UTERI: ICD-10-CM

## 2021-10-29 DIAGNOSIS — J45.909 UNSPECIFIED ASTHMA, UNCOMPLICATED: ICD-10-CM

## 2021-10-29 DIAGNOSIS — Z86.16 PERSONAL HISTORY OF COVID-19: ICD-10-CM

## 2021-10-29 DIAGNOSIS — K21.9 GASTRO-ESOPHAGEAL REFLUX DISEASE WITHOUT ESOPHAGITIS: ICD-10-CM

## 2021-10-29 DIAGNOSIS — F32.A DEPRESSION, UNSPECIFIED: ICD-10-CM

## 2021-10-29 DIAGNOSIS — Z79.899 OTHER LONG TERM (CURRENT) DRUG THERAPY: ICD-10-CM

## 2021-10-29 LAB — HCG UR QL: NEGATIVE — SIGNIFICANT CHANGE UP

## 2021-10-29 PROCEDURE — 88305 TISSUE EXAM BY PATHOLOGIST: CPT

## 2021-10-29 PROCEDURE — 81025 URINE PREGNANCY TEST: CPT

## 2021-10-29 PROCEDURE — 58558 HYSTEROSCOPY BIOPSY: CPT

## 2021-10-29 PROCEDURE — 58300 INSERT INTRAUTERINE DEVICE: CPT

## 2021-10-29 PROCEDURE — 88305 TISSUE EXAM BY PATHOLOGIST: CPT | Mod: 26

## 2021-10-29 RX ORDER — ACETAMINOPHEN 500 MG
975 TABLET ORAL ONCE
Refills: 0 | Status: COMPLETED | OUTPATIENT
Start: 2021-10-29 | End: 2021-10-29

## 2021-10-29 RX ORDER — FAMOTIDINE 10 MG/ML
20 INJECTION INTRAVENOUS ONCE
Refills: 0 | Status: COMPLETED | OUTPATIENT
Start: 2021-10-29 | End: 2021-10-29

## 2021-10-29 RX ADMIN — SODIUM CHLORIDE 75 MILLILITER(S): 9 INJECTION, SOLUTION INTRAVENOUS at 12:32

## 2021-10-29 RX ADMIN — FENTANYL CITRATE 50 MICROGRAM(S): 50 INJECTION INTRAVENOUS at 12:38

## 2021-10-29 RX ADMIN — FENTANYL CITRATE 50 MICROGRAM(S): 50 INJECTION INTRAVENOUS at 12:25

## 2021-10-29 RX ADMIN — FENTANYL CITRATE 50 MICROGRAM(S): 50 INJECTION INTRAVENOUS at 12:35

## 2021-10-29 RX ADMIN — Medication 975 MILLIGRAM(S): at 10:34

## 2021-10-29 RX ADMIN — FAMOTIDINE 20 MILLIGRAM(S): 10 INJECTION INTRAVENOUS at 10:34

## 2021-10-29 RX ADMIN — FENTANYL CITRATE 50 MICROGRAM(S): 50 INJECTION INTRAVENOUS at 12:33

## 2021-10-29 NOTE — ASU DISCHARGE PLAN (ADULT/PEDIATRIC) - NURSING INSTRUCTIONS
Begin with liquids and light food ( tea, toast, Jello, soups). Advance to what you normally eat. Liquids should taken in adequate amounts today.Refer to multi color post op discharge instruction sheet     CALL the DOCTOR:    -Fever greater than  101F  - Signs  of infection such as : increase pain,swelling,redness,or a bad  smell coming from the wound.  -Excessive amount of bleeding.  - Any pain that appears to be getting worse.  - Vomiting  -  If you have  not urinated 8 hours after surgery or have any difficulty urinating.     A responsible adult should be with you for the rest of the day and night for your safety and to help you if you needed.    Review attached FACT SHEET if applicable. Review home care instructions For any problems or concerns,contact your doctor. [facility] patients should call the [facility]. If you cannot reach the doctor or clinic, call [facility] Emergency Department at [phone] or go to your local Emergency Department.  A responsible adult should be with you for the rest of the day and night for your safety and to help you if you needed. Resume your medications as listed on the attached Medication Record.

## 2021-10-29 NOTE — BRIEF OPERATIVE NOTE - NSICDXBRIEFPROCEDURE_GEN_ALL_CORE_FT
PROCEDURES:  Hysteroscopy, with dilation and curettage 29-Oct-2021 12:18:53  Whitney Garcia  Insertion of intrauterine device (IUD) 29-Oct-2021 12:19:08  Whitney Garcia

## 2021-10-29 NOTE — ASU DISCHARGE PLAN (ADULT/PEDIATRIC) - CARE PROVIDER_API CALL
Whitney Perea)  OBSGYN  [facility]  [address] [place] [id number]  Phone: [phone]  Fax: [phone]  Follow Up Time:

## 2021-11-01 ENCOUNTER — APPOINTMENT (OUTPATIENT)
Dept: FAMILY MEDICINE | Facility: CLINIC | Age: 46
End: 2021-11-01

## 2021-11-02 ENCOUNTER — APPOINTMENT (OUTPATIENT)
Dept: FAMILY MEDICINE | Facility: CLINIC | Age: 46
End: 2021-11-02
Payer: COMMERCIAL

## 2021-11-02 VITALS
WEIGHT: 177 LBS | BODY MASS INDEX: 29.49 KG/M2 | TEMPERATURE: 98.5 F | OXYGEN SATURATION: 98 % | DIASTOLIC BLOOD PRESSURE: 70 MMHG | HEIGHT: 65 IN | SYSTOLIC BLOOD PRESSURE: 120 MMHG | HEART RATE: 78 BPM

## 2021-11-02 PROBLEM — M06.9 RHEUMATOID ARTHRITIS, UNSPECIFIED: Chronic | Status: ACTIVE | Noted: 2021-10-27

## 2021-11-02 PROBLEM — N93.9 ABNORMAL UTERINE AND VAGINAL BLEEDING, UNSPECIFIED: Chronic | Status: ACTIVE | Noted: 2021-10-27

## 2021-11-02 PROBLEM — Z28.9 IMMUNIZATION NOT CARRIED OUT FOR UNSPECIFIED REASON: Chronic | Status: ACTIVE | Noted: 2021-10-27

## 2021-11-02 PROBLEM — U07.1 COVID-19: Chronic | Status: ACTIVE | Noted: 2021-10-27

## 2021-11-02 PROBLEM — F41.9 ANXIETY DISORDER, UNSPECIFIED: Chronic | Status: ACTIVE | Noted: 2021-10-27

## 2021-11-02 PROBLEM — J45.909 UNSPECIFIED ASTHMA, UNCOMPLICATED: Chronic | Status: ACTIVE | Noted: 2021-10-27

## 2021-11-02 PROBLEM — Z78.9 OTHER SPECIFIED HEALTH STATUS: Chronic | Status: ACTIVE | Noted: 2021-10-27

## 2021-11-02 PROBLEM — R12 HEARTBURN: Chronic | Status: ACTIVE | Noted: 2021-10-27

## 2021-11-02 PROCEDURE — 99214 OFFICE O/P EST MOD 30 MIN: CPT

## 2021-11-02 NOTE — ASSESSMENT
[FreeTextEntry1] : Rt shoulder/neck pain: suspect cervical spine disorder vs RA, similar to previous symptoms, gave depo-medrol 40 mg x 1, start tylenol#3 prn for pain, start medrol jesus, f/u cervica/shoulder xray, f/u rheumatologist, f/u orthopedist\par RTC 1 wk

## 2021-11-02 NOTE — PHYSICAL EXAM
[No Focal Deficits] : no focal deficits [Normal Gait] : normal gait [Normal] : affect was normal and insight and judgment were intact [de-identified] : ROM intact, nontender [de-identified] : right shoulder ROM limited 2/2 pain, sensation intact, unable to assess muscle strength, no deformities, no warmth/rashes [de-identified] : see msk

## 2021-11-02 NOTE — HISTORY OF PRESENT ILLNESS
[FreeTextEntry8] : 47 yo female presents with hx of RA and cervical radiculopathy presents with complaint of pain in neck and right shoulder. She says it is 9/10 in severity, sharp, worse with use. She says it is similar to pain she had previously which resolved with medrol jesus. She says it began yesterday morning. Denies fever, chills, cp, palpitations, sob, nv, heat/cold intolerance, dizziness, melena, hematochezia, muscle weakness, loss of sensation, bowel/bladder incontinence or calf pain.\par

## 2021-11-03 ENCOUNTER — APPOINTMENT (OUTPATIENT)
Dept: RADIOLOGY | Facility: CLINIC | Age: 46
End: 2021-11-03
Payer: COMMERCIAL

## 2021-11-03 ENCOUNTER — OUTPATIENT (OUTPATIENT)
Dept: OUTPATIENT SERVICES | Facility: HOSPITAL | Age: 46
LOS: 1 days | End: 2021-11-03
Payer: COMMERCIAL

## 2021-11-03 DIAGNOSIS — M54.2 CERVICALGIA: ICD-10-CM

## 2021-11-03 DIAGNOSIS — Z98.891 HISTORY OF UTERINE SCAR FROM PREVIOUS SURGERY: Chronic | ICD-10-CM

## 2021-11-03 PROCEDURE — 73030 X-RAY EXAM OF SHOULDER: CPT

## 2021-11-03 PROCEDURE — 73030 X-RAY EXAM OF SHOULDER: CPT | Mod: 26,50

## 2021-11-03 PROCEDURE — 72040 X-RAY EXAM NECK SPINE 2-3 VW: CPT

## 2021-11-03 PROCEDURE — 72040 X-RAY EXAM NECK SPINE 2-3 VW: CPT | Mod: 26

## 2021-11-05 LAB — SURGICAL PATHOLOGY STUDY: SIGNIFICANT CHANGE UP

## 2021-11-08 ENCOUNTER — APPOINTMENT (OUTPATIENT)
Dept: OBGYN | Facility: CLINIC | Age: 46
End: 2021-11-08
Payer: COMMERCIAL

## 2021-11-08 VITALS
DIASTOLIC BLOOD PRESSURE: 84 MMHG | WEIGHT: 177 LBS | SYSTOLIC BLOOD PRESSURE: 126 MMHG | BODY MASS INDEX: 29.49 KG/M2 | HEIGHT: 65 IN

## 2021-11-08 DIAGNOSIS — Z98.890 OTHER SPECIFIED POSTPROCEDURAL STATES: ICD-10-CM

## 2021-11-08 DIAGNOSIS — Z30.431 ENCOUNTER FOR ROUTINE CHECKING OF INTRAUTERINE CONTRACEPTIVE DEVICE: ICD-10-CM

## 2021-11-08 PROCEDURE — 99213 OFFICE O/P EST LOW 20 MIN: CPT

## 2021-11-08 NOTE — HISTORY OF PRESENT ILLNESS
[Pain is well-controlled] : pain is well-controlled [None] : no vaginal bleeding [Pathology reviewed] : pathology reviewed [Fever] : no fever [Chills] : no chills [Nausea] : no nausea [Vomiting] : no vomiting [Diarrhea] : no diarrhea [Vaginal Bleeding] : no vaginal bleeding [Pelvic Pressure] : no pelvic pressure [Dysuria] : no dysuria [Vaginal Discharge] : no vaginal discharge [Constipation] : no constipation [de-identified] : Soha presents after D&C hysteroscopy Mirena IUD insertion for AUB. She has been feeling well since the surgery but did have mild cramping initially. She denies abnormal bleeding. She reports feeling distressed due to suboptimally treated anxiety/depression and is currently taking effexor. She is on waiting list to see psychiatrist.  [de-identified] : d

## 2021-11-08 NOTE — PLAN
[None] : None [de-identified] : Benign path reviewed. I will provide her with resources for psychiatric care. RTO for sono/IUD check.

## 2021-11-10 ENCOUNTER — NON-APPOINTMENT (OUTPATIENT)
Age: 46
End: 2021-11-10

## 2021-11-12 ENCOUNTER — APPOINTMENT (OUTPATIENT)
Dept: OBGYN | Facility: CLINIC | Age: 46
End: 2021-11-12

## 2021-11-12 ENCOUNTER — APPOINTMENT (OUTPATIENT)
Dept: ORTHOPEDIC SURGERY | Facility: CLINIC | Age: 46
End: 2021-11-12
Payer: COMMERCIAL

## 2021-11-12 VITALS
SYSTOLIC BLOOD PRESSURE: 152 MMHG | WEIGHT: 177 LBS | BODY MASS INDEX: 29.49 KG/M2 | HEART RATE: 99 BPM | DIASTOLIC BLOOD PRESSURE: 103 MMHG | HEIGHT: 65 IN

## 2021-11-12 PROCEDURE — 99204 OFFICE O/P NEW MOD 45 MIN: CPT

## 2021-11-12 NOTE — PHYSICAL EXAM
[Poor Appearance] : well-appearing [Acute Distress] : not in acute distress [Obese] : not obese [de-identified] : CONSTITUTIONAL: Patient is a very pleasant individual who is well-nourished and appears stated age. \par PSYCHIATRIC: Alert and oriented times three and in no apparent distress, and participates with orthopedic evaluation well.\par HEAD: Atraumatic and nonsyndromic in appearance.\par EENT: No thyromegaly, EOMI.\par RESPIRATORY: Respiratory rate is regular, not dyspneic on examination.\par LYMPHATICS: There is no cervical or axillary lymphadenopathy.\par INTEGUMENTARY: Skin is clean, dry, and intact about the bilateral upper extremities and cervical spine. \par VASCULAR: There is brisk capillary refill about the bilateral upper extremities and radial pulses are 2/4. \par NEUROLOGIC: Negative L'hirmitte, negative Spurling’s sign. There are no pathologic reflexes. There is no decrease in sensation of the bilateral upper extremities on Wartenberg pinwheel examination. Deep tendon reflexes are well-maintained at +2/4 of the bilateral upper extremities and are symmetric.\par MUSCULOSKELETAL: There is no visible muscular atrophy. Manual motor strength is well maintained in the bilateral upper extremities. Cervical range of motion is well maintained. The patient ambulates in a non-myelopathic manner. Normal secondary orthopaedic exam of bilateral shoulders, elbows and hands. Elbow flexion and extension, wrist extension, finger flexion and abduction are well maintained. Posterior cervical myositis.  [de-identified] : Xray of a cervical spine taken at [facility] on 11- demonstrates C5-C6 degenerative disc disease with end plate osteophyte formation. Mild degenerative changes at C6-C7\par \par Xray of the BL shoulders taken at [facility] on 11- demonstrates No fracture or dislocation

## 2021-11-12 NOTE — DISCUSSION/SUMMARY
[de-identified] : We talked about the nature of the condition and treatment options. Anticipatory guidance regarding Posterior cervical myositis  was given. \par Patient was advised to continue with rheumatology evaluation, she states she is actively seeking rheumatology care. I believe getting her currently uncontrolled RA and fibromyalgia under control takes precedent at this time. Patient has been referred to pain management for assessment regarding pain control and injection therapy which she states helps her pain. Patient to follow up on a PRN basis, I would not recommend surgical intervention at this time.\par \par Prior to appointment and during encounter with patient extensive medical records were reviewed including but not limited to, hospital records, out patient records, imaging results, and lab data. During this appointment the patient was examined, diagnoses were discussed and explained in a face to face manner. In addition extensive time was spent reviewing aforementioned diagnostic studies. Counseling including abnormal image results, differential diagnoses, treatment options, risk and benefits, lifestyle changes, current condition, and current medications was performed. Patient's comments, questions, and concerns were address and patient verbalized understanding. Based on this patient's presentation at our office, which is an orthopedic spine surgeon's office, this patient inherently / intrinsically has a risk, however minute, of developing  issues such as Cauda equina syndrome, bowel and bladder changes, or progression of motor or neurological deficits such as paralysis which may be permanent.

## 2021-11-12 NOTE — HISTORY OF PRESENT ILLNESS
[de-identified] : 46 year old F presents for an initial evaluation of neck and shoulder pain. She states sometimes pain is so intense she can not lift her arms up do to pain. Patient is currently on a steroid pack which provided relief. She also had a steroid injection 11/05/2021 by her PCP which provided relief. She states her current RA number is 390 and is uncontrolled. She does not want to attend PT, she does not want to enroll in pain management, she does not want pain medication, she does not want repeat steroids, she is not currently taking the RA medication given by her RA doctor. Patient states she stretches everyday and has maximized this modality.  [Ataxia] : no ataxia [Incontinence] : no incontinence [Loss of Dexterity] : good dexterity [Urinary Ret.] : no urinary retention

## 2021-11-12 NOTE — ADDENDUM
[FreeTextEntry1] : Documented by Homero Vasquez acting as a scribe for Mariaa Martin on 11/12/2021 . All medical record entries made by the Scribe were at my, Mariaa Martin, direction and personally dictated by me on 11/12/2021  . I have reviewed the chart and agree that the record accurately reflects my personal performance of the history, physical exam, assessment and plan. I have also personally directed, reviewed, and agreed with the chart.

## 2021-11-14 ENCOUNTER — RESULT CHARGE (OUTPATIENT)
Age: 46
End: 2021-11-14

## 2021-11-15 ENCOUNTER — APPOINTMENT (OUTPATIENT)
Dept: FAMILY MEDICINE | Facility: CLINIC | Age: 46
End: 2021-11-15
Payer: COMMERCIAL

## 2021-11-15 VITALS
TEMPERATURE: 98.2 F | HEIGHT: 65 IN | OXYGEN SATURATION: 98 % | SYSTOLIC BLOOD PRESSURE: 122 MMHG | BODY MASS INDEX: 29.49 KG/M2 | DIASTOLIC BLOOD PRESSURE: 80 MMHG | WEIGHT: 177 LBS | HEART RATE: 88 BPM

## 2021-11-15 DIAGNOSIS — J01.90 ACUTE SINUSITIS, UNSPECIFIED: ICD-10-CM

## 2021-11-15 PROCEDURE — 99213 OFFICE O/P EST LOW 20 MIN: CPT

## 2021-11-15 RX ORDER — METHYLPREDNISOLONE 4 MG/1
4 TABLET ORAL
Qty: 1 | Refills: 0 | Status: DISCONTINUED | COMMUNITY
Start: 2021-11-02 | End: 2021-11-15

## 2021-11-15 RX ORDER — ACETAMINOPHEN AND CODEINE 300; 30 MG/1; MG/1
300-30 TABLET ORAL
Qty: 10 | Refills: 0 | Status: DISCONTINUED | COMMUNITY
Start: 2021-11-02 | End: 2021-11-15

## 2021-11-15 NOTE — REVIEW OF SYSTEMS
[Fever] : fever [Chills] : no chills [Fatigue] : no fatigue [Night Sweats] : no night sweats [Earache] : no earache [Hearing Loss] : no hearing loss [Nasal Discharge] : nasal discharge [Sore Throat] : sore throat [Negative] : Psychiatric

## 2021-11-15 NOTE — HISTORY OF PRESENT ILLNESS
[FreeTextEntry8] : 45 yo female presents with complaint of sore throat, sinus pressure, nasal discharge. she says symptoms began 8 days ago, they initially improved but then worsened. Nasal discharge is yellow/green. She says she had a subjective fever this morning. She says she feels better after taking Tylenol. Denies chills, cp, palpitations, sob, nv, heat/cold intolerance, dizziness, melena, hematochezia, muscle weakness, loss of sensation, bowel/bladder incontinence or calf pain.\par

## 2021-11-15 NOTE — ASSESSMENT
[FreeTextEntry1] : Acute sinusitis: start flonase/nasal saline prn for congestion, start tylenol prn for fever, start doxycycline 100 mg po bid x 7 days, f/u covid pcr, rapid strep negative, f/u throat cx\par RTC 2 wks

## 2021-11-16 LAB — SARS-COV-2 N GENE NPH QL NAA+PROBE: NOT DETECTED

## 2021-11-20 ENCOUNTER — APPOINTMENT (OUTPATIENT)
Dept: RADIOLOGY | Facility: CLINIC | Age: 46
End: 2021-11-20

## 2021-11-23 DIAGNOSIS — J02.0 STREPTOCOCCAL PHARYNGITIS: ICD-10-CM

## 2021-11-23 LAB — BACTERIA THROAT CULT: ABNORMAL

## 2021-11-23 RX ORDER — OXYCODONE AND ACETAMINOPHEN 10; 325 MG/1; MG/1
10-325 TABLET ORAL
Qty: 14 | Refills: 0 | Status: DISCONTINUED | COMMUNITY
Start: 2021-11-04 | End: 2021-11-15

## 2021-11-29 ENCOUNTER — NON-APPOINTMENT (OUTPATIENT)
Age: 46
End: 2021-11-29

## 2021-11-29 ENCOUNTER — LABORATORY RESULT (OUTPATIENT)
Age: 46
End: 2021-11-29

## 2021-11-29 ENCOUNTER — APPOINTMENT (OUTPATIENT)
Dept: RHEUMATOLOGY | Facility: CLINIC | Age: 46
End: 2021-11-29
Payer: COMMERCIAL

## 2021-11-29 VITALS
HEIGHT: 65 IN | DIASTOLIC BLOOD PRESSURE: 83 MMHG | BODY MASS INDEX: 27.49 KG/M2 | TEMPERATURE: 97.4 F | HEART RATE: 82 BPM | WEIGHT: 165 LBS | OXYGEN SATURATION: 99 % | SYSTOLIC BLOOD PRESSURE: 123 MMHG

## 2021-11-29 DIAGNOSIS — M13.0 POLYARTHRITIS, UNSPECIFIED: ICD-10-CM

## 2021-11-29 DIAGNOSIS — M19.90 UNSPECIFIED OSTEOARTHRITIS, UNSPECIFIED SITE: ICD-10-CM

## 2021-11-29 PROCEDURE — 99215 OFFICE O/P EST HI 40 MIN: CPT | Mod: 25

## 2021-11-29 NOTE — HISTORY OF PRESENT ILLNESS
[FreeTextEntry1] : HISTORY: \par 47 y/o  female previously seen by Dr. Kleiner for evaluation for rheumatic disease on 7/2021. \par In 2/2021, Patient developed intermittent progressive to constant pain of b/l shoulders, elbows, wrists, PIP/DIPs, hips without swelling/redness. Worse in AM with AM stiffness x 25 mins. Neck pain radiating to bilateral upper extremities to the elbows. Low back pain radiating to the right lower extremity to knee. Pt was treated with oxycodone. Pt found to have positive RF and referred to rheumatology. \par Pt also reports sleep disturbance, fatigue. Denies snoring. Reports Raynaud’s with cold exposure (fingers turn white, blue, red with paresthesias, occurs ~3x/day). No family Hx of CTD. \par Patient was deemed to have clinical signs of RA with positive RF and CCP and was sent for bloodwork and XRs. However, pt has not followed up with Dr. Kleiner since the first visit. \par \par INTERVAL HISTORY: \par Patient has been treated with intermittent steroid tapers since first seen by Dr. Kleiner during which her symptoms greatly improve. However, pt reports steroids makes her irritable. Pt has not had XR ordered by Dr. Kleiner yet but will do it soon.\par Pt denies any other symptoms other than extensive joint pains and Raynaud's as above.\par \par WORKUP: \par Remarkable for (7/2021): , CCP+, ESR 38 \par Normal/neg (7/2021): SAMIRA, Hep B/C panel, Syphilis, Chlamydia/GC, HIV \par XR C-Spine (11/2021): Multilevel DJD \par XR b/l shoulders (11/2021): Normal \par

## 2021-11-29 NOTE — ASSESSMENT
[FreeTextEntry1] : 47 y/o  female previously seen by Dr. Kleiner for evaluation for rheumatic disease on 7/2021. \par In 2/2021, Patient developed intermittent progressive to constant pain of b/l shoulders, elbows, wrists, PIP/DIPs, hips without swelling/redness. Worse in AM with AM stiffness x 25 mins. Neck pain radiating to bilateral upper extremities to the elbows. Low back pain radiating to the right lower extremity to knee. Pt was treated with oxycodone. Pt found to have positive RF and referred to rheumatology. \par Pt also reports sleep disturbance, fatigue. Denies snoring. Reports Raynaud’s with cold exposure (fingers turn white, blue, red with paresthesias, occurs ~3x/day). No family Hx of CTD. \par Patient was deemed to have clinical signs of RA with positive RF, CCP and was sent for bloodwork and XRs. However, pt has not followed up with Dr. Kleiner since the first visit.\par \par Patient has clinical RA along with high positive RF and CCP. However, it is unclear if pt's neck pain that radiates down b/l arms is related to RA or degenerative disease. Uncontrolled RA can affect cervical spine but not common such early in disease course.\par \par - Obtain further bloodwork for medication safety and disease activity.\par - Patient to obtain XR ordered by Dr. Kleiner on last visit\par - Obtain MR of C-Spine to evaluate pt's severe neck and arm symptoms, which can help distinguish RA component vs. DJD with nerve compression\par - Pt to follow w/ pain management for possible epidural\par - Start MTX 4->6 tabs PO once weekly with daily folic acid for long term control of RA\par - Side effects of MTX were discussed with the patient in detail including but not limited to oral ulcers, alopecia, elevated LFTs, abdominal discomfort, pneumonitis, and cytopenias. This is a high-risk therapy requiring intense monitoring for toxicity. Will evaluate with frequent monitoring of CBC and LFTs. Advised patient to take folic acid daily to prevent complications of MTX.\par Counseled to minimize alcohol intake (no more than 3 drinks/week) and to avoid pregnancy while on MTX.\par - Hepatitis panel negative 7/2021.\par - Start PDN 15mg -> 5mg daily taper until MTX effect\par - Pt is starting calcium and Vit D daily. Will discuss adjustment based on pt's PDN dosage long term and Vit D levels\par - RTC 1 month for follow up and safety labs

## 2021-11-30 LAB
25(OH)D3 SERPL-MCNC: 26.8 NG/ML
ALBUMIN SERPL ELPH-MCNC: 4.2 G/DL
ALP BLD-CCNC: 69 U/L
ALT SERPL-CCNC: 14 U/L
ANION GAP SERPL CALC-SCNC: 13 MMOL/L
AST SERPL-CCNC: 13 U/L
BASOPHILS # BLD AUTO: 0.07 K/UL
BASOPHILS NFR BLD AUTO: 0.8 %
BILIRUB SERPL-MCNC: <0.2 MG/DL
BUN SERPL-MCNC: 15 MG/DL
CALCIUM SERPL-MCNC: 9.4 MG/DL
CHLORIDE SERPL-SCNC: 104 MMOL/L
CO2 SERPL-SCNC: 22 MMOL/L
CREAT SERPL-MCNC: 0.6 MG/DL
CRP SERPL-MCNC: 4 MG/L
EOSINOPHIL # BLD AUTO: 0.21 K/UL
EOSINOPHIL NFR BLD AUTO: 2.3 %
GLUCOSE SERPL-MCNC: 95 MG/DL
HCT VFR BLD CALC: 43.9 %
HGB BLD-MCNC: 14.2 G/DL
IMM GRANULOCYTES NFR BLD AUTO: 0.4 %
LYMPHOCYTES # BLD AUTO: 2.61 K/UL
LYMPHOCYTES NFR BLD AUTO: 28.6 %
MAN DIFF?: NORMAL
MCHC RBC-ENTMCNC: 30.3 PG
MCHC RBC-ENTMCNC: 32.3 GM/DL
MCV RBC AUTO: 93.6 FL
MONOCYTES # BLD AUTO: 0.84 K/UL
MONOCYTES NFR BLD AUTO: 9.2 %
NEUTROPHILS # BLD AUTO: 5.35 K/UL
NEUTROPHILS NFR BLD AUTO: 58.7 %
PLATELET # BLD AUTO: 342 K/UL
POTASSIUM SERPL-SCNC: 4.4 MMOL/L
PROT SERPL-MCNC: 7.1 G/DL
RBC # BLD: 4.69 M/UL
RBC # FLD: 13.3 %
SODIUM SERPL-SCNC: 139 MMOL/L
WBC # FLD AUTO: 9.12 K/UL

## 2021-12-01 ENCOUNTER — APPOINTMENT (OUTPATIENT)
Dept: FAMILY MEDICINE | Facility: CLINIC | Age: 46
End: 2021-12-01

## 2021-12-01 LAB
M TB IFN-G BLD-IMP: NEGATIVE
QUANTIFERON TB PLUS MITOGEN MINUS NIL: 7.32 IU/ML
QUANTIFERON TB PLUS NIL: 0.1 IU/ML
QUANTIFERON TB PLUS TB1 MINUS NIL: 0.05 IU/ML
QUANTIFERON TB PLUS TB2 MINUS NIL: 0.04 IU/ML

## 2021-12-09 ENCOUNTER — APPOINTMENT (OUTPATIENT)
Dept: RADIOLOGY | Facility: CLINIC | Age: 46
End: 2021-12-09

## 2021-12-10 ENCOUNTER — NON-APPOINTMENT (OUTPATIENT)
Age: 46
End: 2021-12-10

## 2021-12-10 PROBLEM — Z00.00 ENCOUNTER FOR PREVENTIVE HEALTH EXAMINATION: Noted: 2021-12-10

## 2021-12-14 ENCOUNTER — APPOINTMENT (OUTPATIENT)
Dept: MRI IMAGING | Facility: CLINIC | Age: 46
End: 2021-12-14

## 2022-01-05 ENCOUNTER — APPOINTMENT (OUTPATIENT)
Dept: RHEUMATOLOGY | Facility: CLINIC | Age: 47
End: 2022-01-05

## 2022-01-07 ENCOUNTER — APPOINTMENT (OUTPATIENT)
Dept: OBGYN | Facility: CLINIC | Age: 47
End: 2022-01-07

## 2022-01-07 ENCOUNTER — APPOINTMENT (OUTPATIENT)
Dept: ANTEPARTUM | Facility: CLINIC | Age: 47
End: 2022-01-07

## 2022-02-02 ENCOUNTER — APPOINTMENT (OUTPATIENT)
Dept: FAMILY MEDICINE | Facility: CLINIC | Age: 47
End: 2022-02-02
Payer: COMMERCIAL

## 2022-02-02 PROCEDURE — 99214 OFFICE O/P EST MOD 30 MIN: CPT | Mod: 95

## 2022-02-02 RX ORDER — DOXYCYCLINE HYCLATE 100 MG/1
100 TABLET ORAL
Qty: 14 | Refills: 0 | Status: DISCONTINUED | COMMUNITY
Start: 2021-11-15 | End: 2022-02-02

## 2022-02-02 RX ORDER — BENZONATATE 100 MG/1
100 CAPSULE ORAL 3 TIMES DAILY
Qty: 30 | Refills: 0 | Status: DISCONTINUED | COMMUNITY
Start: 2021-12-28 | End: 2022-02-02

## 2022-02-02 RX ORDER — AMOXICILLIN 500 MG/1
500 TABLET, FILM COATED ORAL
Qty: 20 | Refills: 0 | Status: DISCONTINUED | COMMUNITY
Start: 2021-11-23 | End: 2022-02-02

## 2022-02-02 RX ORDER — PREDNISONE 5 MG/1
5 TABLET ORAL
Qty: 45 | Refills: 1 | Status: DISCONTINUED | COMMUNITY
Start: 2021-11-29 | End: 2022-02-02

## 2022-02-02 NOTE — ASSESSMENT
[FreeTextEntry1] : ADD: improving, Recommend maintaining daily schedule, limiting distractions, use charts/checklists, c/w current regimen\par RTC 4 wks

## 2022-02-02 NOTE — HISTORY OF PRESENT ILLNESS
[FreeTextEntry1] : f/u ADHD [de-identified] : 45 yo female with pmhx of ADHD presents for follow up. Reports doing well on current regimen. Notes signfiicant improvement. Denies fever, chills, cp, palpitations, sob, nv, heat/cold intolerance, dizziness, melena, hematochezia, muscle weakness, loss of sensation, bowel/bladder incontinence or calf pain.\par

## 2022-02-09 ENCOUNTER — APPOINTMENT (OUTPATIENT)
Dept: FAMILY MEDICINE | Facility: CLINIC | Age: 47
End: 2022-02-09

## 2022-02-15 NOTE — REVIEW OF SYSTEMS
"Group Therapy Documentation    PATIENT'S NAME: Tamra Jaimes  MRN:   [id number]  :   2007  ACCT. NUMBER: [id number]  DATE OF SERVICE: 2/15/22  START TIME: 10:38 AM  END TIME: 11:30 AM  FACILITATOR(S): Wolfgang Downs LMFT  TOPIC: Child/Adol Group Therapy  Number of patients attending the group:  5  Group Length:  1 Hours    Summary of Group / Topics Discussed:    Group check in on anxiety, depression scales - discussing discharge plans - identifying anxiety triggers and exposure to anxiety rather than avoidance       Group Attendance:  Attended group session    Patient's response to the group topic/interactions:  cooperative with task, expressed readiness to alter behaviors, gave appropriate feedback to peers and listened actively    Patient appeared to be Actively participating, Attentive and Engaged.       Client specific details:  Pt discussed feeling \"irritable\" and explained they get irritable when they are in \"loud groups\" and when they are in loud environments. Pt also discussed the possibility of returning for more eating disorder therapy because of their issues with \"binge eating.\" We discussed how Pt can gain exposure to eating in front of the group and explored what that was like for them to join the program and eat in front of other people. Pt was open to looking at anxieties and reported being ambivalent about wanting to address more of it here. Pt has shared \"being in therapy their whole life\" but does not currently have a therapist outside program to address eating concerns. Reported no safety risks today.        " [Joint Pain] : joint pain [Negative] : Psychiatric

## 2022-02-22 ENCOUNTER — RX RENEWAL (OUTPATIENT)
Age: 47
End: 2022-02-22

## 2022-03-09 ENCOUNTER — APPOINTMENT (OUTPATIENT)
Dept: FAMILY MEDICINE | Facility: CLINIC | Age: 47
End: 2022-03-09

## 2022-04-26 ENCOUNTER — APPOINTMENT (OUTPATIENT)
Dept: FAMILY MEDICINE | Facility: CLINIC | Age: 47
End: 2022-04-26
Payer: COMMERCIAL

## 2022-04-26 PROCEDURE — 99213 OFFICE O/P EST LOW 20 MIN: CPT

## 2022-04-26 RX ORDER — VENLAFAXINE HYDROCHLORIDE 75 MG/1
75 CAPSULE, EXTENDED RELEASE ORAL
Qty: 42 | Refills: 0 | Status: DISCONTINUED | COMMUNITY
Start: 2021-07-13 | End: 2022-04-26

## 2022-04-26 RX ORDER — FLUTICASONE PROPIONATE 50 UG/1
50 SPRAY, METERED NASAL TWICE DAILY
Qty: 1 | Refills: 1 | Status: DISCONTINUED | COMMUNITY
Start: 2021-11-15 | End: 2022-04-26

## 2022-04-26 NOTE — HISTORY OF PRESENT ILLNESS
[Home] : at home, [unfilled] , at the time of the visit. [Medical Office: ([facility])___] : at the medical office located in  [Verbal consent obtained from patient] : the patient, [unfilled] [FreeTextEntry1] : anxiety [de-identified] : 47 yo female presents for follow up of anxiety. reports significant stress due to custody cox over children with ex . She reports significant relief after taking alprazolam. Denies fever, chills, cp, palpitations, sob, nv, heat/cold intolerance, dizziness, melena, hematochezia, muscle weakness, loss of sensation, bowel/bladder incontinence or calf pain.\par

## 2022-04-26 NOTE — ASSESSMENT
[FreeTextEntry1] : Anxiety: recommend exercise, yoga, meditation, f/u therapist, c/w alprazolam prn for anxiety\par RTC 4 wks

## 2022-05-05 ENCOUNTER — APPOINTMENT (OUTPATIENT)
Dept: PHYSICAL MEDICINE AND REHAB | Facility: CLINIC | Age: 47
End: 2022-05-05
Payer: COMMERCIAL

## 2022-05-05 VITALS
HEIGHT: 65 IN | HEART RATE: 95 BPM | WEIGHT: 155 LBS | SYSTOLIC BLOOD PRESSURE: 128 MMHG | RESPIRATION RATE: 14 BRPM | DIASTOLIC BLOOD PRESSURE: 78 MMHG | BODY MASS INDEX: 25.83 KG/M2

## 2022-05-05 DIAGNOSIS — Z87.39 PERSONAL HISTORY OF OTHER DISEASES OF THE MUSCULOSKELETAL SYSTEM AND CONNECTIVE TISSUE: ICD-10-CM

## 2022-05-05 PROCEDURE — 99214 OFFICE O/P EST MOD 30 MIN: CPT

## 2022-05-05 RX ORDER — FOLIC ACID 1 MG/1
1 TABLET ORAL DAILY
Qty: 90 | Refills: 1 | Status: COMPLETED | COMMUNITY
Start: 2021-11-29 | End: 2022-05-05

## 2022-05-05 RX ORDER — SOFT LENS DISINFECTANT
SOLUTION, NON-ORAL MISCELLANEOUS
Qty: 1 | Refills: 0 | Status: COMPLETED | COMMUNITY
Start: 2021-06-10 | End: 2022-05-05

## 2022-05-05 NOTE — PHYSICAL EXAM
[FreeTextEntry1] : NAD\par A&Ox3\par Non-obese\par C-spine ROM: 1 FB chin-to-sternum; 30' extension; 60' R LR; 55' L LR\par Costa's: +/- right; neg left\par Lhermitte's: neg\par Spurling's: neg\par DTR's: 2+ B/T/Br/K/A\par MMT: 5/5 b/l UE\par Sensation: SILT\par Right Shoulder\par Full PROM ABD/FF w/o pain \par Neer's: neg\par Hawkin's: neg\par MMT 5-/5 B/L D/SS/IS\par Distal SS tendon insertion site VTTP\par Right elbow - no synovitis\par Lateral epicondyle VTTP (static)\par Cozen's +\par Tinel's neg\par Right wrist - no synovitis\par No ulnar deviation MCPs\par Phalen's neg\par Periscapular myofascial TPs\par

## 2022-05-05 NOTE — HISTORY OF PRESENT ILLNESS
[FreeTextEntry1] : 46 y.o. F w/ recent onset RA and likely FM pain syndrome returns to office for f/u.  Pt. states that her putative dx of RA was confirmed.  She was started on MTX and prednisone but has since ran out of her medications and has not had f/u w/ Rheum MD since Nov. 2021.  Pt. was taking left over prednisone 10 mg tabs which helped her chronic MSK pain complaints.  But has been having more pain in neck and right elbow.  Pt. states that she had C-spine x-rays but not MRI.  Neck pain may radiate into right arm.  Denies N/T hand or feet.  Never came back for NCS.  No recent courses of P.T.   Had IM injection steroids left arm for pain relief by PMD.   Of note, pt. is still on Adderall but stopped venlafaxine.

## 2022-05-05 NOTE — ASSESSMENT
[FreeTextEntry1] : 46 y.o. F w/ h/o RA and likely FM pain syndrome.  I spent most of today's office visit (25 min) reviewing the patient's most recent x-rays, discussing pathogenesis, further diagnostic work-up and non-operative management.  Needs MRI C-spine to evaluate for HNP/stenosis given her x-ray findings of C5-7 DDD, as well as r/o any pannus formation at C1-2 articulation.  Discussed utility of MSK US evaluation right elbow to evaluate CET tendinosis/partial tear.  No need for NCS UE now as patient denies paresthesias in hands and has negative provocative testing.  Asked pt. to complete ACR Criteria for FM and will review at next visit.  Discussed benefits of aerobic exercise and proper sleep hygiene as effective lifestyle modifications for patients w/ FM.  Pt. may benefit from nerve membrane stabilizing agent (ie, Lyrica) but she is on Adderall for ADHD.  May consider referral for PETRA after MRI review.  Further advised to f/u w/ Rheum MD for medical management of RA.  Pt. is in agreement with plan.  All questions answered.  RTC for US and MRI review.

## 2022-05-05 NOTE — DATA REVIEWED
[Plain X-Rays] : plain X-Rays [FreeTextEntry1] : X-rays C-spine (Nov 2021): There is moderate degenerative disc disease at C5-C6 with disc space narrowing and endplate osteophyte formation. Mild degenerative changes seen at C4-C5. There is minimal anterolisthesis at C6-C7. Lower cervical mild facet arthrosis is present. There is no fracture identified. The prevertebral soft tissues are normal. The bilateral shoulders are normal in appearance. The glenohumeral joints and AC joint's are intact. There is no fracture or dislocation.

## 2022-05-16 ENCOUNTER — OUTPATIENT (OUTPATIENT)
Dept: OUTPATIENT SERVICES | Facility: HOSPITAL | Age: 47
LOS: 1 days | End: 2022-05-16
Payer: COMMERCIAL

## 2022-05-16 ENCOUNTER — APPOINTMENT (OUTPATIENT)
Dept: MRI IMAGING | Facility: CLINIC | Age: 47
End: 2022-05-16
Payer: COMMERCIAL

## 2022-05-16 DIAGNOSIS — M54.12 RADICULOPATHY, CERVICAL REGION: ICD-10-CM

## 2022-05-16 DIAGNOSIS — Z98.891 HISTORY OF UTERINE SCAR FROM PREVIOUS SURGERY: Chronic | ICD-10-CM

## 2022-05-16 DIAGNOSIS — M50.90 CERVICAL DISC DISORDER, UNSPECIFIED, UNSPECIFIED CERVICAL REGION: ICD-10-CM

## 2022-05-16 PROCEDURE — 72141 MRI NECK SPINE W/O DYE: CPT

## 2022-05-16 PROCEDURE — 72141 MRI NECK SPINE W/O DYE: CPT | Mod: 26

## 2022-05-23 ENCOUNTER — APPOINTMENT (OUTPATIENT)
Dept: PHYSICAL MEDICINE AND REHAB | Facility: CLINIC | Age: 47
End: 2022-05-23
Payer: COMMERCIAL

## 2022-05-23 PROCEDURE — 20550 NJX 1 TENDON SHEATH/LIGAMENT: CPT | Mod: RT

## 2022-05-23 PROCEDURE — 99214 OFFICE O/P EST MOD 30 MIN: CPT | Mod: 25

## 2022-05-23 PROCEDURE — 76942 ECHO GUIDE FOR BIOPSY: CPT

## 2022-05-23 NOTE — ASSESSMENT
[FreeTextEntry1] : 46 y.o. F w/ h/o RA and likely FM pain syndrome w/ chronic right lateral elbow.  I spent most of today's office visit (25 min) reviewing and performing the patient's US guided R elbow peritendinous CET CSI, reviewing her MRI C-spine, discussing pathogenesis and further non-operative management.  US R elbow c/w chronic lateral epicondylosis.  MRI C-spine c/w C5-7 DDD + left C3-4 FJ OA/edema of unclear significance.  No pannus formation at C1-2.  No need for PETRA now.  Discussed R/B/A to US guided R CET PRP injection depending on her response to today's CSI.  Rx P.T. for C-spine and R elbow pain.  No need for NCS UE now as patient denies paresthesias in hands and has negative provocative testing.  Asked pt. to complete ACR Criteria for FM and will review at next visit.  Again, discussed benefits of aerobic exercise and proper sleep hygiene as effective lifestyle modifications for patients w/ FM.  Pt. may benefit from nerve membrane stabilizing agent (ie, Lyrica) but she is on Adderall for ADHD.  Further advised to f/u w/ Rheum MD for medical management of RA.  Pt. is in agreement with plan.  All questions answered.  RTC 4 weeks.

## 2022-05-23 NOTE — PROCEDURE
[de-identified] : Reason for Procedure: RIGHT LATERAL ELBOW/FOREARM PAIN\par Physician: DEBBY SALGUERO DO\par Medication: 20 MG KENALOG, 1.5 CC 1% LIDOCAINE\par Blood Loss: None\par Complications: None\par \par \par PROCEDURE NOTE: R/B/A to USG RIGHT ELBOW COMMON EXTENSOR PERITENDINOUS CSI explained to patient. Pt. is agreeable and wishes to proceed. Signed consent form to be scanned into EMR. The patient was positioned LAT DECUB POSITION with the RIGHT ELBOW FLEXED to about 90' and FOREARM PRONATED.  The transducer was placed in long axis to the common extensor tendon origin over the lateral epicondyle of the humerus. Using an in-plane approach, a 25-G, 2.0" needle was passed in close proximity to the CET.  This was followed by injection of a therapeutic solution consisting of 1 cc 1% lidocaine + 40 mg Kenalog. The patient tolerated the procedure well without adverse effects. A band aid and ice pack was applied. Post injection instructions given.\par

## 2022-05-23 NOTE — DATA REVIEWED
[Plain X-Rays] : plain X-Rays [FreeTextEntry1] : MRI C-spine (May 2022):  Advanced left-sided facet arthrosis with perifacet edema at C3-C4. Moderate to severe left C3-C4 foraminal narrowing.  Diffuse disc osteophyte complexes at C5-C6 and C6-C7 with mild spinal canal and moderate bilateral foraminal narrowing at these levels.\par \par X-rays C-spine (Nov 2021): There is moderate degenerative disc disease at C5-C6 with disc space narrowing and endplate osteophyte formation. Mild degenerative changes seen at C4-C5. There is minimal anterolisthesis at C6-C7. Lower cervical mild facet arthrosis is present. There is no fracture identified. The prevertebral soft tissues are normal. The bilateral shoulders are normal in appearance. The glenohumeral joints and AC joint's are intact. There is no fracture or dislocation.

## 2022-05-23 NOTE — HISTORY OF PRESENT ILLNESS
[FreeTextEntry1] : 46 y.o. F w/ h/o RA and likely FM pain syndrome returns to office for MRI C-spine review and MSK US examination right lateral elbow.  Results detailed below.

## 2022-06-30 ENCOUNTER — NON-APPOINTMENT (OUTPATIENT)
Age: 47
End: 2022-06-30

## 2022-07-06 ENCOUNTER — APPOINTMENT (OUTPATIENT)
Dept: RHEUMATOLOGY | Facility: CLINIC | Age: 47
End: 2022-07-06

## 2022-08-29 NOTE — ASU PATIENT PROFILE, ADULT - SURGICAL SITE INCISION
History of Present Illness: I have reviewed and appreciated the technician's history and test results as outlined above.      CC: Pt is here today for an eye health evaluation and contact lens exam.  Patient states he has noticed no vision changes and has no concerns at this time.       SLIT LAMP EXAM     LIDS: Normal     CORNEA:  Clear and normal     CONJUNCTIVA:  Clear     ANTERIOR CHAMBER:  Deep and quiet     LENS: clear and normal     Posterior Segment Exam: OU (both eyes)     Vitreous:  clear  Optic Nerve:  flat, pink and healthy with +SVP (positive spontaneous venous pulse)  C/D (cup/disc) ratio:  OD (right eye):  0.25 //  OS:  0.25  Macula:  healthy with foveal reflex  Vessels:  healthy with normal A-V (arteriovenous) ratio  Periphery:  flat, healthy without tears or retinal detachment        CONTACT LENS EVALUATION:  good fit and centration        TX (TREATMENT):  Order replacement lens of present lenses being worn.  update contact lense Rx    Contact lens fitting fee  
Past Medical History:   Diagnosis Date   • Healthy infant or child    • Hypertrophy of tonsil with adenoids 3/19/2012   • Nevus 6/1/2016    4 by 5 mm  oval brown nevus on back just to left of midline at TL junction  with smooth surface   • Undiagnosed cardiac murmurs 3/19/2012   • Wheezing     x 1 with bronchitis       
no

## 2022-11-03 ENCOUNTER — APPOINTMENT (OUTPATIENT)
Dept: FAMILY MEDICINE | Facility: CLINIC | Age: 47
End: 2022-11-03

## 2022-11-03 PROCEDURE — 99213 OFFICE O/P EST LOW 20 MIN: CPT | Mod: 95

## 2022-11-03 NOTE — ASSESSMENT
[FreeTextEntry1] : Anxiety: recommend exercise, yoga, meditation, c/w alprazolam prn, declined thearpist/sw referral\par ADD: Recommend maintaining daily schedule, limiting distractions, use charts/checklists, c/w amph/dextro as needed\par RTC 4 wks

## 2022-11-03 NOTE — HISTORY OF PRESENT ILLNESS
[Home] : at home, [unfilled] , at the time of the visit. [Medical Office: ([facility])___] : at the medical office located in  [Verbal consent obtained from patient] : the patient, [unfilled] [FreeTextEntry1] : follow up [de-identified] : 46 yo female presents for follow up of anxiety. reports significant improvement with alprazolam as needed. She is requesting increase in dose. Denies fever, chills, cp, palpitations, sob, nv, heat/cold intolerance, dizziness, melena, hematochezia, muscle weakness, loss of sensation, bowel/bladder incontinence or calf pain.\par

## 2022-12-06 ENCOUNTER — APPOINTMENT (OUTPATIENT)
Dept: FAMILY MEDICINE | Facility: CLINIC | Age: 47
End: 2022-12-06

## 2022-12-06 DIAGNOSIS — Z20.822 CONTACT WITH AND (SUSPECTED) EXPOSURE TO COVID-19: ICD-10-CM

## 2022-12-06 PROCEDURE — 99211 OFF/OP EST MAY X REQ PHY/QHP: CPT

## 2022-12-07 ENCOUNTER — APPOINTMENT (OUTPATIENT)
Dept: FAMILY MEDICINE | Facility: CLINIC | Age: 47
End: 2022-12-07

## 2022-12-07 LAB
RAPID RVP RESULT: NOT DETECTED
SARS-COV-2 RNA PNL RESP NAA+PROBE: NOT DETECTED

## 2022-12-28 ENCOUNTER — APPOINTMENT (OUTPATIENT)
Dept: RHEUMATOLOGY | Facility: CLINIC | Age: 47
End: 2022-12-28
Payer: COMMERCIAL

## 2022-12-28 VITALS
BODY MASS INDEX: 25.83 KG/M2 | HEART RATE: 75 BPM | DIASTOLIC BLOOD PRESSURE: 87 MMHG | WEIGHT: 155 LBS | OXYGEN SATURATION: 99 % | RESPIRATION RATE: 18 BRPM | HEIGHT: 65 IN | SYSTOLIC BLOOD PRESSURE: 129 MMHG

## 2022-12-28 PROCEDURE — 99215 OFFICE O/P EST HI 40 MIN: CPT | Mod: 25

## 2022-12-28 NOTE — ASSESSMENT
[FreeTextEntry1] : 48 y/o  female presents as follow up for RA.  \par In 2/2021, Patient developed intermittent progressive to constant pain of b/l shoulders, elbows, wrists, PIP/DIPs, hips without swelling/redness. Worse in AM with AM stiffness x 25 mins. Neck pain radiating to bilateral upper extremities to the elbows. Low back pain radiating to the right lower extremity to knee. Pt was treated with oxycodone. Pt found to have positive RF and referred to rheumatology.  \par Pt also reports sleep disturbance, fatigue. Denies snoring. Reports Raynaud’s with cold exposure (fingers turn white, blue, red with paresthesias, occurs ~3x/day). No family Hx of CTD.  \par Patient was deemed to have clinical signs of RA with positive RF, CCP and was sent for bloodwork and XRs. \par \par Pt was diagnosed with RA by me and starte on MTX and PDN taper in 11/2021 which improved her symptoms, but pt has not followed up with me until now and has run out of medications with significant worsening of inflammatory arthritis.\par \par I re-emphasized the seriousness of RA and importance of follow up and long term DMARDs to decrease joint inflammation, pain, long term damage.\par \par - Obtain bloodwork for medication safety and disease activity. \par - Obtain XRs of various joints to evaluate erosive disease \par - Rx LEF 20mg PO daily. Side effects of LEF were discussed with the patient in detail including but not limited to GI upset, HTN, hepatotoxicity, and cytopenias.\par This is a high-risk therapy requiring intense monitoring for toxicity. Will evaluate with frequent monitoring of BP, CBC, and LFTs.\par - Rx PDN 15mg -> 5mg daily until seen by me for symptomatic relief. Advised that we will minimize use of PDN and will likely discontinue on next visit if RA is better controlled.\par - RTC 2 months for follow up and safety labs (pt states telemedicine may be easier for pt)\par

## 2022-12-28 NOTE — HISTORY OF PRESENT ILLNESS
[FreeTextEntry1] : HISTORY: \par 46 y/o  female presents as follow up for RA.  \par In 2/2021, Patient developed intermittent progressive to constant pain of b/l shoulders, elbows, wrists, PIP/DIPs, hips without swelling/redness. Worse in AM with AM stiffness x 25 mins. Neck pain radiating to bilateral upper extremities to the elbows. Low back pain radiating to the right lower extremity to knee. Pt was treated with oxycodone. Pt found to have positive RF and referred to rheumatology.  \par Pt also reports sleep disturbance, fatigue. Denies snoring. Reports Raynaud’s with cold exposure (fingers turn white, blue, red with paresthesias, occurs ~3x/day). No family Hx of CTD.  \par Patient was deemed to have clinical signs of RA with positive RF, CCP and was sent for bloodwork and XRs. However, pt has not followed up with Dr. Kleiner since the first visit. \par \par INTERVAL HISTORY: \par Pt was last seen by me in 11/2021. Pt was started on MTX and PDN taper with great improvement in her symptoms of inflammatory arthritis, but has not followed up with me since then. Pt ran out of MTX and PDN and has been having worsening joint pains, tendinitis, carpal tunnel symptoms since then. Pt reports difficulty keeping track of multiple pills of MTX when she was on it last year.\par \par WORKUP:  \par Remarkable for (7/2021 - 11/2022): , CCP+, Vit D 25-OH 26.8 \par Normal/neg (7/2021 - 11/2022): CBC, CMP, SAMIRA, Hep B/C panel, Syphilis, Chlamydia/GC, HIV, Quantiferon TB \par XR C-Spine (11/2021): Multilevel DJD \par XR b/l shoulders (11/2021): Normal \par MR C-spine (5/2022): Advanced L sided facet arthrosis with perifacet edema and C3-C4. Moderate to severe L C3-C4 foraminal narrowing. Diffuse disc osteophyte complexes at C5-C6 and C6-C7 with mild spinal canal and moderate b/l foraminal narrowing.\par

## 2022-12-29 LAB
ALBUMIN SERPL ELPH-MCNC: 4.2 G/DL
ALP BLD-CCNC: 53 U/L
ALT SERPL-CCNC: 11 U/L
ANION GAP SERPL CALC-SCNC: 9 MMOL/L
AST SERPL-CCNC: 12 U/L
BASOPHILS # BLD AUTO: 0.06 K/UL
BASOPHILS NFR BLD AUTO: 1 %
BILIRUB SERPL-MCNC: 0.3 MG/DL
BUN SERPL-MCNC: 18 MG/DL
CALCIUM SERPL-MCNC: 9.4 MG/DL
CHLORIDE SERPL-SCNC: 106 MMOL/L
CO2 SERPL-SCNC: 24 MMOL/L
CREAT SERPL-MCNC: 0.61 MG/DL
CRP SERPL-MCNC: <3 MG/L
EGFR: 110 ML/MIN/1.73M2
EOSINOPHIL # BLD AUTO: 0.18 K/UL
EOSINOPHIL NFR BLD AUTO: 3.1 %
ERYTHROCYTE [SEDIMENTATION RATE] IN BLOOD BY WESTERGREN METHOD: 24 MM/HR
GLUCOSE SERPL-MCNC: 118 MG/DL
HBV CORE IGG+IGM SER QL: NONREACTIVE
HBV SURFACE AB SER QL: NONREACTIVE
HBV SURFACE AG SER QL: NONREACTIVE
HCT VFR BLD CALC: 42.8 %
HCV AB SER QL: NONREACTIVE
HCV S/CO RATIO: 0.09 S/CO
HGB BLD-MCNC: 13.7 G/DL
IMM GRANULOCYTES NFR BLD AUTO: 0.2 %
LYMPHOCYTES # BLD AUTO: 1.75 K/UL
LYMPHOCYTES NFR BLD AUTO: 30.2 %
MAN DIFF?: NORMAL
MCHC RBC-ENTMCNC: 29.8 PG
MCHC RBC-ENTMCNC: 32 GM/DL
MCV RBC AUTO: 93 FL
MONOCYTES # BLD AUTO: 0.44 K/UL
MONOCYTES NFR BLD AUTO: 7.6 %
NEUTROPHILS # BLD AUTO: 3.35 K/UL
NEUTROPHILS NFR BLD AUTO: 57.9 %
PLATELET # BLD AUTO: 282 K/UL
POTASSIUM SERPL-SCNC: 4.2 MMOL/L
PROT SERPL-MCNC: 6.4 G/DL
RBC # BLD: 4.6 M/UL
RBC # FLD: 13.2 %
SODIUM SERPL-SCNC: 140 MMOL/L
WBC # FLD AUTO: 5.79 K/UL

## 2023-01-02 LAB
M TB IFN-G BLD-IMP: NEGATIVE
QUANTIFERON TB PLUS MITOGEN MINUS NIL: >10 IU/ML
QUANTIFERON TB PLUS NIL: 0.06 IU/ML
QUANTIFERON TB PLUS TB1 MINUS NIL: 0.04 IU/ML
QUANTIFERON TB PLUS TB2 MINUS NIL: 0.07 IU/ML

## 2023-01-16 NOTE — PHYSICAL EXAM
Family Medicine History and Physical      Subjective:     Yahir Means is a 70 yo male with PMH significant for CAD s/p PCI in 03/22 on plavix, HFrEF, HTN, COPD, DM type II, HLD, and GERD who presented from [facility] facility with worsening orthopnea and exertional dyspnea and vague history of resolved unstable angina.  He endorses that he had some vague chest discomfort last week, and reached out to his cardiologist Dr. Terry, who advised him to present to the ED.  He endorses that he has become increasingly short of breath with exertion, including ADLs such as making his bed, which is new for him over the last 1-2 weeks.  He also endorses worsening orthopnea, noting that he sleeps on 3 pillows and gets short of breath if he tries to lay flat at night.  He denies fever, chills, diarrhea, nausea, vomiting, cough or increased wheezing, sore throat, rhinorrhea, or increased oxygen use at home.  He denies leg swelling or tightness.      Notably, he states that he underwent left coronary PCI in March 2022 for one stent, with discussion of placing a second stent due to concern for RCA stenosis, which was unable to be performed during that hospitalization for a variety of factors.      ED Course:   Vitals: Tmax 98.4 degF, -113, RR 14-20, BP /60-71, saturating well on 2L O2 via NC  Pertinent labs: Cr 1.43 (baseline 0.6-0.9 per chart review, 1.38 October 2022), proBNP 276, troponin neg x1, CBC unremarkable  Imaging:   ECG: sinus tachycardia, left ventricular hypertrophy, QRS widening, repolarization abnormality, no concern for criteria for inferior infarct compared to 01/05 ECG  Chest x-ray: Cardiomediastinal silhouette is normal size, decreased since the  prior study. There are no airspace or interstitial opacities.  There is no evidence of pneumothorax.  Trachea is midline.  The costophrenic angles are clear.    Visualized osseous structures appear unremarkable.  Treatment: 325 mg ASA  Consults: None,  admitted to medicine service    Patient Active Problem List    Diagnosis Date Noted   • S/P cardiac catheterization 03/22/2022     Priority: High   • Unstable angina (CMS/HCC) 01/16/2023     Priority: Low   • Hypoxia 10/10/2022     Priority: Low   • Hypertensive heart disease with acute on chronic systolic congestive heart failure (CMS/HCC) 10/10/2022     Priority: Low   • Elevated troponin 10/10/2022     Priority: Low   • Status post coronary artery stent placement 10/10/2022     Priority: Low   • Sepsis (CMS/HCC) 05/11/2022     Priority: Low   • Hypotension 05/11/2022     Priority: Low   • Chronic systolic heart failure (CMS/HCC) 05/11/2022     Priority: Low   • Type 2 diabetes mellitus without complication, without long-term current use of insulin (CMS/HCC) 05/11/2022     Priority: Low   • Acute HFrEF (heart failure with reduced ejection fraction) (CMS/HCC) 03/25/2022     Priority: Low   • Respiratory failure (CMS/HCC) 03/23/2022     Priority: Low   • Coronary artery disease involving native coronary artery of native heart without angina pectoris 03/22/2022     Priority: Low   • Acute low back pain 03/22/2022     Priority: Low   • Mixed hyperlipidemia 03/22/2022     Priority: Low   • Gastroesophageal reflux disease without esophagitis 03/22/2022     Priority: Low   • COPD (chronic obstructive pulmonary disease) (CMS/HCC) 02/24/2022     Priority: Low     Past Medical History:   Diagnosis Date   • Congestive cardiac failure (CMS/HCC)    • COPD (chronic obstructive pulmonary disease) (CMS/HCC)    • Coronary artery disease involving native coronary artery of native heart without angina pectoris 03/22/2022   • Elevated troponin 10/10/2022   • Essential (primary) hypertension    • GERD (gastroesophageal reflux disease)    • High cholesterol    • Hypertensive heart disease with acute on chronic systolic congestive heart failure (CMS/HCC) 10/10/2022   • Status post coronary artery stent placement 10/10/2022      Past  Surgical History:   Procedure Laterality Date   • Colonoscopy     • Egd        (Not in a hospital admission)    ALLERGIES:  No Known Allergies   Social History     Tobacco Use   • Smoking status: Former     Packs/day: 0.25     Types: Cigarettes   • Smokeless tobacco: Never   • Tobacco comments:     Smoker for many years,stated he quit 6 mos ago.Counselling provided on dangers involved and encouraged not to restart   Substance Use Topics   • Alcohol use: Never      Family History   Family history unknown: Yes        Review of Systems  Review of Systems   Constitutional: Negative for chills, diaphoresis and fever.   HENT: Negative for congestion and rhinorrhea.    Eyes: Negative.    Respiratory: Positive for chest tightness and shortness of breath. Negative for cough and wheezing.    Cardiovascular: Positive for leg swelling. Negative for chest pain.   Gastrointestinal: Positive for constipation. Negative for abdominal pain, diarrhea and nausea.   Endocrine: Negative.    Genitourinary: Negative.    Musculoskeletal: Negative.    Neurological: Negative.    Psychiatric/Behavioral: Negative.       Objective:     Vitals:    01/16/23 1653   BP: 105/71   Pulse: (!) 103   Resp: 14   Temp:      No intake/output data recorded.  No intake/output data recorded.    Physical Exam  Constitutional:       General: He is not in acute distress.     Appearance: Normal appearance. He is not toxic-appearing.      Comments: Sitting up in bed on 2L O2 at rest   HENT:      Head: Normocephalic and atraumatic.      Right Ear: External ear normal.      Left Ear: External ear normal.      Mouth/Throat:      Pharynx: Oropharynx is clear.      Neck: Normal range of motion.   Eyes:      Extraocular Movements: Extraocular movements intact.      Conjunctiva/sclera: Conjunctivae normal.   Cardiovascular:      Rate and Rhythm: Normal rate and regular rhythm.      Heart sounds: Normal heart sounds. No murmur heard.  Pulmonary:      Effort: No respiratory  distress.      Breath sounds: Normal breath sounds. No wheezing or rales.      Comments: No crackles  Abdominal:      General: There is no distension.      Palpations: Abdomen is soft.      Tenderness: There is no abdominal tenderness.   Musculoskeletal:         General: Normal range of motion.      Right lower leg: Edema (1-2+ pitting to mid-shin) present.      Left lower leg: Edema (1-2+ pitting to mid-shin) present.   Skin:     General: Skin is warm.   Neurological:      General: No focal deficit present.      Mental Status: He is alert and oriented to person, place, and time.   Psychiatric:         Mood and Affect: Mood normal.         Behavior: Behavior normal.         Thought Content: Thought content normal.         Judgment: Judgment normal.        Data Review:   Labs:   Recent Results (from the past 24 hour(s))   Comprehensive Metabolic Panel    Collection Time: 01/16/23  2:57 PM   Result Value Ref Range    Fasting Status      Sodium 141 135 - 145 mmol/L    Potassium 4.0 3.4 - 5.1 mmol/L    Chloride 106 97 - 110 mmol/L    Carbon Dioxide 27 21 - 32 mmol/L    Anion Gap 12 7 - 19 mmol/L    Glucose 119 (H) 70 - 99 mg/dL    BUN 20 6 - 20 mg/dL    Creatinine 1.43 (H) 0.67 - 1.17 mg/dL    Glomerular Filtration Rate 53 (L) >=60    BUN/ Creatinine Ratio 14 7 - 25    Calcium 9.2 8.4 - 10.2 mg/dL    Bilirubin, Total 0.5 0.2 - 1.0 mg/dL    GOT/AST 24 <=37 Units/L    GPT/ALT 24 <64 Units/L    Alkaline Phosphatase 76 45 - 117 Units/L    Albumin 3.5 (L) 3.6 - 5.1 g/dL    Protein, Total 7.5 6.4 - 8.2 g/dL    Globulin 4.0 2.0 - 4.0 g/dL    A/G Ratio 0.9 (L) 1.0 - 2.4   TROPONIN I, HIGH SENSITIVITY    Collection Time: 01/16/23  2:57 PM   Result Value Ref Range    Troponin I, High Sensitivity 24 <77 ng/L   CBC with Automated Differential (performable only)    Collection Time: 01/16/23  2:57 PM   Result Value Ref Range    WBC 5.9 4.2 - 11.0 K/mcL    RBC 4.74 4.50 - 5.90 mil/mcL    HGB 13.7 13.0 - 17.0 g/dL    HCT 42.4 39.0 -  51.0 %    MCV 89.5 78.0 - 100.0 fl    MCH 28.9 26.0 - 34.0 pg    MCHC 32.3 32.0 - 36.5 g/dL    RDW-CV 12.9 11.0 - 15.0 %    RDW-SD 42.3 39.0 - 50.0 fL     140 - 450 K/mcL    NRBC 0 <=0 /100 WBC    Neutrophil, Percent 43 %    Lymphocytes, Percent 42 %    Mono, Percent 11 %    Eosinophils, Percent 3 %    Basophils, Percent 1 %    Immature Granulocytes 0 %    Absolute Neutrophils 2.5 1.8 - 7.7 K/mcL    Absolute Lymphocytes 2.5 1.0 - 4.0 K/mcL    Absolute Monocytes 0.6 0.3 - 0.9 K/mcL    Absolute Eosinophils  0.2 0.0 - 0.5 K/mcL    Absolute Basophils 0.1 0.0 - 0.3 K/mcL    Absolute Immmature Granulocytes 0.0 0.0 - 0.2 K/mcL   NT proBNP    Collection Time: 01/16/23  2:57 PM   Result Value Ref Range    NT-proBNP 276 (H) <=125 pg/mL   Electrocardiogram 12-Lead    Collection Time: 01/16/23  2:59 PM   Result Value Ref Range    Ventricular Rate EKG/Min (BPM) 109     Atrial Rate (BPM) 109     AK-Interval (MSEC) 144     QRS-Interval (MSEC) 130     QT-Interval (MSEC) 366     QTc 493     P Axis (Degrees) 83     R Axis (Degrees) 45     T Axis (Degrees) 141     REPORT TEXT       Sinus tachycardia  Left ventricular hypertrophy  with QRS widening and repolarization abnormality  Abnormal ECG  When compared with ECG of  05-JAN-2023 13:55,  Criteria for  Inferior infarct  are no longer  present         Imaging:   XR CHEST PA AND LATERAL 2 VIEWS   Final Result   FINDINGS AND IMPRESSION: .   The lungs are well expanded. Previously noted interstitial opacities have   resolved.  Cardiomediastinal silhouette is normal size, decreased since the   prior study. There are no airspace or interstitial opacities.     There is no evidence of pneumothorax.  Trachea is midline.  The   costophrenic angles are clear.     Visualized osseous structures appear unremarkable.      Electronically Signed by: GI GIBSON M.D.    Signed on: 1/16/2023 3:20 PM               Assessment and Plan:     Yahir Means is a 70 yo male with PMH significant for  CAD s/p PCI on plavix, HFrEF, HTN, COPD, DM type II, HLD, and GERD who presented with worsening orthopnea and exertional dyspnea, admitted for management of acute on chronic decompensated heart failure and known inferior cardiac ischemia.     #Unstable angina, resolved  #Inferior cardiac ischemia  #CAD s/p PCI  #HLD  -Endorsing vague history of chest discomfort last week, resolved by presentation to ED  -Cardiac MRI with stress test 01/05: LVEF 24%, with CAD with intermediate viability in RCA territory, stress perfusion images show infarction in mane-infarct ischemia in RCA territory  -TTE 10/2022: LVEF 34%  -Troponin negative x1  -ECG: sinus tachycardia, left ventricular hypertrophy, with QRS widening and abnormal repolarization, not concerning for infarct  -Chest x-ray demonstrates normal cardiac silhouette, no signs of fluid overload  -Tachycardic to low 100s on telemetry  -Given 324 mg ASA  Plan:  -Continue to monitor on telemetry  -Repeat ECG if becomes symptomatic  -Follow up on troponin X2  -Continue home atorvastatin 80 mg daily  -Continue home plavix 75 mg and ASA 81 mg  -Plan to consult interventional cardiology when patient more euvolemic for evaluation for possible PCI placement     #Acute on chronic decompensated HfrEF  -Patient with worsening orthopnea and exercise intolerance, with pitting edema and elevated ProBNP, secondary to fluid overload and worsening heart failure  -Chest x-ray and lung exam reassuring  Cardiac MRI with stress test 01/05: LVEF 24%, with CAD with intermediate viability in RCA territory, stress perfusion images show infarction in mane-infarct ischemia in RCA territory  -TTE 10/2022: LVEF 34%  -On lasix 20 mg PO daily  Plan:  -40 mg BID lasix  -Continue home metoprolol  -Continue to monitor on telemetry  -Oxygen support as needed    #AMY  #Possible CKD, not diagnosed  -Baseline Cr. 0.6-0.9, elevated to 1.38 10/2022  -Possible component of chronic kidney disease, not currently  diagnosed, versus AMY  -40 mg lasix BID, continue to   -Repeat CMP in AM 01/17/2023    #HTN, controlled  -Pressures currently controlled, continue to  monitor    #DM II  -Previously on metformin, endorsing not currently taking it  -LDISS  -Continue to monitor    #GERD  -On home omeprazole 40 mg daily, endorses that it is not helpful - held  -Continue to monitor    #COPD  -On 2L of home oxygen when ambulating long distances  -No concern for COPD exacerbation on exam  -Continue home scheduled fluticasone-salmeterol inhaler  -Continue to monitor oxygen saturation    #Chronic constipation, stable  -Intermittent constipation at home, on home senna nightly  -Consider bowel regimen for comfort    F: SLIV  E: Replete PRN  N: Cardiac diet  DVT PPX: lovenox  Bowel Regimen: as needed    Code Status: Full code  Emergency Contact: Sister,   Primary Care Physician: Tiera Carson PA-C  Dispo: Admitted to medicine service to fluid management and possible PCI placement    Discussed with attending Dr. Terry.     Jess Parsons MD  PGY1 Family Medicine Resident  Please contact via [employer]  1/16/2023 5:44 PM    Pt seen and examined  Discussed and Agree with Dr Parsons  He has been having ADL limiting Angina for several weeks and I have been begging him to come to the ED.  Upon Arrival, He is volume overload and can not lie flat  We will diurese him and then assess his known CAD  He recently had a Cardiac Stress MRI   It showed EF 25% and ischemia in the RCA territory with intermediate viability  I have shown the images to Dr Hoover who specializes in  and may be able to fix his  of his RCA  Pt understands that this may NOT relieve his symptoms but he desperately wants to try.  We will consult Dr Hoover and patient has agreed to this plan  The patient is terrified of being in the hospital and has agreed to stay another night for diuresis.  He will meet Dr Hoover tomorrow and they will decide on the optimal date of the PCI  I  was hoping to do this in once admission, however I doubt the patient will stay here that long because he is scared to be here.  Most likely, He and Dr Hoover will schedule an elective PCI of the RCA  at a reasonable time.  Discussed with Team, Nursing, and Cardiology Colleages    Hakan Terry MD, Cardiovascular Medicine     [Normal] : affect was normal and insight and judgment were intact

## 2023-01-26 ENCOUNTER — APPOINTMENT (OUTPATIENT)
Dept: FAMILY MEDICINE | Facility: CLINIC | Age: 48
End: 2023-01-26
Payer: COMMERCIAL

## 2023-01-26 PROCEDURE — 99213 OFFICE O/P EST LOW 20 MIN: CPT | Mod: 95

## 2023-01-26 RX ORDER — METHOTREXATE 2.5 MG/1
2.5 TABLET ORAL
Qty: 24 | Refills: 2 | Status: DISCONTINUED | COMMUNITY
Start: 2021-11-29 | End: 2023-01-26

## 2023-01-26 NOTE — HISTORY OF PRESENT ILLNESS
[Home] : at home, [unfilled] , at the time of the visit. [Medical Office: ([facility])___] : at the medical office located in  [Verbal consent obtained from patient] : the patient, [unfilled] [FreeTextEntry1] : follow up  [de-identified] : 48 yo female presents for follow up of anxiety and ADHD. Reports feeling well. Doing well on current medications. Notes improved concentration. No fever, chils, cp, palpitations, sob, nvcd.

## 2023-01-26 NOTE — ASSESSMENT
[FreeTextEntry1] : ADD: Recommend maintaining daily schedule, limiting distractions, use charts/checklists, c/w dextro/amph prn\par Anxiety: c/w alprazolam prn, recommend exercise, yoga, medication, declined therapist/sw referral\par RTC 4 wks

## 2023-02-02 ENCOUNTER — NON-APPOINTMENT (OUTPATIENT)
Age: 48
End: 2023-02-02

## 2023-02-23 ENCOUNTER — RX RENEWAL (OUTPATIENT)
Age: 48
End: 2023-02-23

## 2023-02-28 ENCOUNTER — APPOINTMENT (OUTPATIENT)
Dept: RHEUMATOLOGY | Facility: CLINIC | Age: 48
End: 2023-02-28
Payer: COMMERCIAL

## 2023-02-28 PROCEDURE — 99214 OFFICE O/P EST MOD 30 MIN: CPT | Mod: 95

## 2023-02-28 RX ORDER — PREDNISONE 5 MG/1
5 TABLET ORAL
Qty: 30 | Refills: 0 | Status: COMPLETED | COMMUNITY
Start: 2022-12-28 | End: 2023-02-28

## 2023-02-28 NOTE — ASSESSMENT
[FreeTextEntry1] : 46 y/o  female presents as follow up for RA.  \par In 2/2021, Patient developed intermittent progressive to constant pain of b/l shoulders, elbows, wrists, PIP/DIPs, hips without swelling/redness. Worse in AM with AM stiffness x 25 mins. Neck pain radiating to bilateral upper extremities to the elbows. Low back pain radiating to the right lower extremity to knee. Pt was treated with oxycodone. Pt found to have positive RF and referred to rheumatology.  \par Pt also reports sleep disturbance, fatigue. Denies snoring. Reports Raynaud’s with cold exposure (fingers turn white, blue, red with paresthesias, occurs ~3x/day). No family Hx of CTD.  \par Patient was deemed to have clinical signs of RA with positive RF, CCP and was sent for bloodwork and XRs.  \par \par Pt was diagnosed with RA by me and started on MTX and PDN taper in 11/2021 which improved her symptoms, but ran out and had difficulty due to complexity of directions.\par Pt was started on LEF by me in 12/2022 with potential headaches, diarrhea, insomnia as side effect but with near resolution of her joint pains.\par \par Pt will let me know about any frequency or severity of flares and side effects for any changes in chronic treatment\par \par - Pt has follow up with Gyn for regular labwork. Will defer labwork today.\par - c/w LEF 20mg PO daily. Side effects of LEF were discussed with the patient in detail including but not limited to GI upset, HTN, hepatotoxicity, and cytopenias. \par This is a high-risk therapy requiring intense monitoring for toxicity. Will evaluate with frequent monitoring of BP, CBC, and LFTs. \par - Last Hep B/C and Quantiferon TB negative 12/2022 \par - Pt is now off chronic PDN! \par - RTC 3 months for follow up\par

## 2023-02-28 NOTE — HISTORY OF PRESENT ILLNESS
[Home] : at home, [unfilled] , at the time of the visit. [Medical Office: ([facility])___] : at the medical office located in  [FreeTextEntry1] : HISTORY: \par 48 y/o  female presents as follow up for RA.  \par In 2/2021, Patient developed intermittent progressive to constant pain of b/l shoulders, elbows, wrists, PIP/DIPs, hips without swelling/redness. Worse in AM with AM stiffness x 25 mins. Neck pain radiating to bilateral upper extremities to the elbows. Low back pain radiating to the right lower extremity to knee. Pt was treated with oxycodone. Pt found to have positive RF and referred to rheumatology.  \par Pt also reports sleep disturbance, fatigue. Denies snoring. Reports Raynaud’s with cold exposure (fingers turn white, blue, red with paresthesias, occurs ~3x/day). No family Hx of CTD.  \par Patient was deemed to have clinical signs of RA with positive RF, CCP and was sent for bloodwork and XRs.  \par \par Pt was diagnosed with RA by me and started on MTX and PDN taper in 11/2021 which improved her symptoms, but pt has not followed up with me until now and has run out of medications with significant worsening of inflammatory arthritis. \par \par INTERVAL HISTORY: \par Pt reports she has been on LEF since 12/2022 with some headaches, diarrhea, insomnia, but unclear if side effect of the medication or not.\par Pt's joint pains have nearly resolved since starting LEF and pt discontinued her PDN about 6 days ago without recurrence of joint pains.\par \par WORKUP: \par Remarkable for (7/2021 - 12/2022): , CCP+, Vit D 25-OH 26.8  \par Normal/neg (7/2021 - 12/2022): CBC, CMP, SAMIRA, Hep B/C panel, Syphilis, Chlamydia/GC, HIV, Quantiferon TB  \par XR C-Spine (11/2021): Multilevel DJD  \par XR b/l shoulders (11/2021): Normal  \par MR C-spine (5/2022): Advanced L sided facet arthrosis with perifacet edema and C3-C4. Moderate to severe L C3-C4 foraminal narrowing. Diffuse disc osteophyte complexes at C5-C6 and C6-C7 with mild spinal canal and moderate b/l foraminal narrowing.

## 2023-03-01 ENCOUNTER — APPOINTMENT (OUTPATIENT)
Dept: OBGYN | Facility: CLINIC | Age: 48
End: 2023-03-01
Payer: COMMERCIAL

## 2023-03-01 VITALS
WEIGHT: 167 LBS | DIASTOLIC BLOOD PRESSURE: 72 MMHG | BODY MASS INDEX: 27.82 KG/M2 | HEIGHT: 65 IN | SYSTOLIC BLOOD PRESSURE: 114 MMHG

## 2023-03-01 DIAGNOSIS — R92.2 INCONCLUSIVE MAMMOGRAM: ICD-10-CM

## 2023-03-01 DIAGNOSIS — Z12.4 ENCOUNTER FOR SCREENING FOR MALIGNANT NEOPLASM OF CERVIX: ICD-10-CM

## 2023-03-01 DIAGNOSIS — Z11.3 ENCOUNTER FOR SCREENING FOR INFECTIONS WITH A PREDOMINANTLY SEXUAL MODE OF TRANSMISSION: ICD-10-CM

## 2023-03-01 PROCEDURE — 99396 PREV VISIT EST AGE 40-64: CPT

## 2023-03-01 RX ORDER — GABAPENTIN 100 MG/1
100 CAPSULE ORAL
Qty: 24 | Refills: 0 | Status: ACTIVE | COMMUNITY
Start: 2022-09-01

## 2023-03-02 ENCOUNTER — APPOINTMENT (OUTPATIENT)
Dept: ORTHOPEDIC SURGERY | Facility: CLINIC | Age: 48
End: 2023-03-02

## 2023-03-02 ENCOUNTER — NON-APPOINTMENT (OUTPATIENT)
Age: 48
End: 2023-03-02

## 2023-03-02 ENCOUNTER — RX RENEWAL (OUTPATIENT)
Age: 48
End: 2023-03-02

## 2023-03-02 NOTE — PHYSICAL EXAM
[Chaperone Present] : A chaperone was present in the examining room during all aspects of the physical examination [FreeTextEntry1] : mimi [Appropriately responsive] : appropriately responsive [Alert] : alert [No Acute Distress] : no acute distress [No Lymphadenopathy] : no lymphadenopathy [Regular Rate Rhythm] : regular rate rhythm [Soft] : soft [Non-tender] : non-tender [Non-distended] : non-distended [Oriented x3] : oriented x3 [Examination Of The Breasts] : a normal appearance [No Discharge] : no discharge [No Masses] : no breast masses were palpable [Labia Majora] : normal [Labia Minora] : normal [No Bleeding] : There was no active vaginal bleeding [Normal] : normal [Uterine Adnexae] : non-palpable

## 2023-03-02 NOTE — HISTORY OF PRESENT ILLNESS
[No] : Patient does not have concerns regarding sex [FreeTextEntry1] : Soha is a  LMP unknown who presents for annual exam. She is without complaints. Denies pelvic pain, abnormal bleeding, or vaginal discharge. Denies issues with bowel or bladder function. \par 2 c-sections\par She is sexually active with  partner (s). She is using IUD for contraception. Denies pain with intercourse. She is sexually satisfied. \par Lives with family. 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 Feels safe at home. Denies depression/abuse. \par Recently diagnosed with RA. Was taking prednisone and recently weaned.  [Currently Active] : currently active [Patient would like to be screened for STIs] : Patient would like to be screened for STIs [FreeTextEntry4] : will get bloodwork at [facility]

## 2023-03-02 NOTE — DISCUSSION/SUMMARY
[FreeTextEntry1] :   The benefits of adequate calcium intake and a daily multivitamin along with routine daily cardiovascular exercise were reviewed with the patient.\par   The patient was informed regarding the benefits of a screening colonoscopy.\par   The importance of safer-sex was discussed with the patient.\par We reviewed ASCCP/ACOG guidelines for pap smears. \par I recommended leaving IUD in place until she is sure she has gone through menopause.

## 2023-03-03 ENCOUNTER — NON-APPOINTMENT (OUTPATIENT)
Age: 48
End: 2023-03-03

## 2023-03-03 LAB
C TRACH RRNA SPEC QL NAA+PROBE: NOT DETECTED
HPV HIGH+LOW RISK DNA PNL CVX: NOT DETECTED
N GONORRHOEA RRNA SPEC QL NAA+PROBE: NOT DETECTED
SOURCE TP AMPLIFICATION: NORMAL

## 2023-03-08 ENCOUNTER — TRANSCRIPTION ENCOUNTER (OUTPATIENT)
Age: 48
End: 2023-03-08

## 2023-03-09 RX ORDER — ADALIMUMAB 40MG/0.4ML
40 KIT SUBCUTANEOUS
Qty: 2 | Refills: 5 | Status: ACTIVE | COMMUNITY
Start: 2023-03-02 | End: 1900-01-01

## 2023-03-13 ENCOUNTER — NON-APPOINTMENT (OUTPATIENT)
Age: 48
End: 2023-03-13

## 2023-03-14 LAB — CYTOLOGY CVX/VAG DOC THIN PREP: ABNORMAL

## 2023-03-15 ENCOUNTER — APPOINTMENT (OUTPATIENT)
Dept: RHEUMATOLOGY | Facility: CLINIC | Age: 48
End: 2023-03-15
Payer: COMMERCIAL

## 2023-03-15 PROCEDURE — 99213 OFFICE O/P EST LOW 20 MIN: CPT | Mod: 95

## 2023-03-15 NOTE — HISTORY OF PRESENT ILLNESS
[Home] : at home, [unfilled] , at the time of the visit. [Medical Office: ([facility])___] : at the medical office located in  [Verbal consent obtained from patient] : the patient, [unfilled] [FreeTextEntry1] : HISTORY: \par 48 y/o  female presents as follow up for RA.  \par In 2/2021, Patient developed intermittent progressive to constant pain of b/l shoulders, elbows, wrists, PIP/DIPs, hips without swelling/redness. Worse in AM with AM stiffness x 25 mins. Neck pain radiating to bilateral upper extremities to the elbows. Low back pain radiating to the right lower extremity to knee. Pt was treated with oxycodone. Pt found to have positive RF and referred to rheumatology.  \par Pt also reports sleep disturbance, fatigue. Denies snoring. Reports Raynaud’s with cold exposure (fingers turn white, blue, red with paresthesias, occurs ~3x/day). No family Hx of CTD.  \par Patient was deemed to have clinical signs of RA with positive RF, CCP and was sent for bloodwork and XRs.  \par \par Pt was diagnosed with RA by me and started on MTX and PDN taper in 11/2021 which improved her symptoms, but ran out and had difficulty due to complexity of directions. \par Pt was started on LEF by me in 12/2022 with potential headaches, diarrhea, insomnia as side effect but with near resolution of her joint pains. \par \par INTERVAL HISTORY: \par Pt restarted the PDN taper from 20mg -> 5mg/day with improvement in her pains but has been gaining significant weight.\par Pt obtained Humira and wanted to discuss effects, side effects in detail.\par \par WORKUP: \par Remarkable for (7/2021 - 12/2022): , CCP+, Vit D 25-OH 26.8  \par Normal/neg (7/2021 - 12/2022): CBC, CMP, SAMIRA, Hep B/C panel, Syphilis, Chlamydia/GC, HIV, Quantiferon TB  \par XR C-Spine (11/2021): Multilevel DJD  \par XR b/l shoulders (11/2021): Normal  \par MR C-spine (5/2022): Advanced L sided facet arthrosis with perifacet edema and C3-C4. Moderate to severe L C3-C4 foraminal narrowing. Diffuse disc osteophyte complexes at C5-C6 and C6-C7 with mild spinal canal and moderate b/l foraminal narrowing.

## 2023-03-15 NOTE — ASSESSMENT
[FreeTextEntry1] : 46 y/o  female presents as follow up for RA.  \par In 2/2021, Patient developed intermittent progressive to constant pain of b/l shoulders, elbows, wrists, PIP/DIPs, hips without swelling/redness. Worse in AM with AM stiffness x 25 mins. Neck pain radiating to bilateral upper extremities to the elbows. Low back pain radiating to the right lower extremity to knee. Pt was treated with oxycodone. Pt found to have positive RF and referred to rheumatology.  \par Pt also reports sleep disturbance, fatigue. Denies snoring. Reports Raynaud’s with cold exposure (fingers turn white, blue, red with paresthesias, occurs ~3x/day). No family Hx of CTD.  \par Patient was deemed to have clinical signs of RA with positive RF, CCP and was sent for bloodwork and XRs.  \par \par Pt was diagnosed with RA by me and started on MTX and PDN taper in 11/2021 which improved her symptoms, but ran out and had difficulty due to complexity of directions. \par Pt was started on LEF by me in 12/2022 with potential headaches, diarrhea, insomnia as side effect but with near resolution of her joint pains.\par However, pt started to have significant flares again.\par \par Humira was added on 3/2023, to be started today.\par \par - Discussed with pt at length about Humira directions, effects, side effects. Side effects of Humira were discussed with the pt in detail including increased risk of infection, demyelinating disease, re-activation of latent TB, possible increased risk of solid and skin tumors. Advised pt to seek medical care ASAP if he/she has an infection and advised to stop the medication until infection resolves.\par This is a high-risk therapy requiring intense monitoring for toxicity.\par - c/w LEF 20mg PO daily. Side effects of LEF were discussed with the patient in detail including but not limited to GI upset, HTN, hepatotoxicity, and cytopenias.  \par This is a high-risk therapy requiring intense monitoring for toxicity. Will evaluate with frequent monitoring of BP, CBC, and LFTs.  \par - Last Hep B/C and Quantiferon TB negative 12/2022  \par - Advised to wean off current PDN 5mg/day when comfortable\par - RTC 3 months for follow up \par

## 2023-03-16 DIAGNOSIS — Z79.620 LONG TERM (CURRENT) USE OF IMMUNOSUPPRESSIVE BIOLOGIC: ICD-10-CM

## 2023-03-16 RX ORDER — CONTAINER,EMPTY
EACH MISCELLANEOUS
Qty: 1 | Refills: 0 | Status: ACTIVE | COMMUNITY
Start: 2023-03-16 | End: 1900-01-01

## 2023-03-28 ENCOUNTER — NON-APPOINTMENT (OUTPATIENT)
Age: 48
End: 2023-03-28

## 2023-04-26 ENCOUNTER — RX RENEWAL (OUTPATIENT)
Age: 48
End: 2023-04-26

## 2023-04-27 ENCOUNTER — APPOINTMENT (OUTPATIENT)
Dept: FAMILY MEDICINE | Facility: CLINIC | Age: 48
End: 2023-04-27
Payer: COMMERCIAL

## 2023-04-27 VITALS
BODY MASS INDEX: 27.49 KG/M2 | TEMPERATURE: 97.5 F | OXYGEN SATURATION: 98 % | SYSTOLIC BLOOD PRESSURE: 116 MMHG | HEIGHT: 65 IN | WEIGHT: 165 LBS | HEART RATE: 76 BPM | DIASTOLIC BLOOD PRESSURE: 70 MMHG

## 2023-04-27 DIAGNOSIS — Z12.11 ENCOUNTER FOR SCREENING FOR MALIGNANT NEOPLASM OF COLON: ICD-10-CM

## 2023-04-27 DIAGNOSIS — J06.9 ACUTE UPPER RESPIRATORY INFECTION, UNSPECIFIED: ICD-10-CM

## 2023-04-27 PROCEDURE — 99214 OFFICE O/P EST MOD 30 MIN: CPT

## 2023-04-27 RX ORDER — PREDNISONE 10 MG/1
10 TABLET ORAL
Qty: 120 | Refills: 0 | Status: DISCONTINUED | COMMUNITY
Start: 2023-02-02 | End: 2023-04-27

## 2023-04-27 RX ORDER — IBUPROFEN 600 MG/1
600 TABLET, FILM COATED ORAL
Qty: 30 | Refills: 0 | Status: DISCONTINUED | COMMUNITY
Start: 2022-09-01 | End: 2023-04-27

## 2023-04-27 RX ORDER — ALPRAZOLAM 0.5 MG/1
0.5 TABLET ORAL TWICE DAILY
Qty: 30 | Refills: 0 | Status: DISCONTINUED | COMMUNITY
Start: 2021-07-08 | End: 2023-04-27

## 2023-04-27 NOTE — HISTORY OF PRESENT ILLNESS
[FreeTextEntry8] : 46 yo female presents with complaint of sinus infection. She reports sinus pressure, nasal congestion with green discharge. Denies fever, chills, cp, palpitations, sob, nvcd.\par

## 2023-04-27 NOTE — ASSESSMENT
[FreeTextEntry1] : Acute URI: recommend supportive care, start tylenol prn for fever/pain, recommend increased hydration, call EMS if symptoms worsen or develop sob. Recommend hand hygiene, cover mouth when coughing, wash hands frequently, f/u covid19/viral panel, start amox/clav bid\par ADD: Recommend maintaining daily schedule, limiting distractions, use charts/checklists, c/w current regimen\par Anxiety: recommend exercise, yoga, meditation, c/w current regimen\par RTC 2 wks

## 2023-04-28 LAB
RAPID RVP RESULT: NOT DETECTED
SARS-COV-2 RNA PNL RESP NAA+PROBE: NOT DETECTED

## 2023-05-16 ENCOUNTER — APPOINTMENT (OUTPATIENT)
Dept: FAMILY MEDICINE | Facility: CLINIC | Age: 48
End: 2023-05-16

## 2023-05-23 ENCOUNTER — RX RENEWAL (OUTPATIENT)
Age: 48
End: 2023-05-23

## 2023-05-23 RX ORDER — FLUTICASONE PROPIONATE 50 UG/1
50 SPRAY, METERED NASAL TWICE DAILY
Qty: 16 | Refills: 0 | Status: ACTIVE | COMMUNITY
Start: 2023-04-27 | End: 1900-01-01

## 2023-05-30 RX ORDER — ALBUTEROL SULFATE 2.5 MG/3ML
(2.5 MG/3ML) SOLUTION RESPIRATORY (INHALATION)
Qty: 1 | Refills: 2 | Status: ACTIVE | COMMUNITY
Start: 2021-06-10 | End: 1900-01-01

## 2023-06-05 NOTE — ASU DISCHARGE PLAN (ADULT/PEDIATRIC) - PAIN MANAGEMENT
Monitor your MyChart for STI results. Follow-up with PCP within 1 day. Return with any worsening or continued symptoms. Take antibiotics as prescribed. Please understand that at this time there is no evidence for a more serious underlying process, but that early in the process of an illness or injury, an emergency department workup can be falsely reassuring. You should contact your family doctor within the next 48 hours for a follow up appointment    Autumnabdoul Wagner!!!    From [address] and [facility]    On behalf of the Emergency Department staff at [address], I would like to thank you for giving us the opportunity to address your health care needs and concerns. We hope that during your visit, our service was delivered in a professional and caring manner. Please keep [address] in mind as we walk with you down the path to your own personal wellness. Please expect an automated text message or email from us so we can ask a few questions about your health and progress. Based on your answers, a clinician may call you back to offer help and instructions. Please understand that early in the process of an illness or injury, an emergency department workup can be falsely reassuring. If you notice any worsening, changing or persistent symptoms please call your family doctor or return to the ER immediately. Tell us how we did during your visit at [email or web address]   and let us know about your experience
Take over the counter pain medication

## 2023-06-26 ENCOUNTER — APPOINTMENT (OUTPATIENT)
Dept: OTOLARYNGOLOGY | Facility: CLINIC | Age: 48
End: 2023-06-26

## 2023-06-30 ENCOUNTER — APPOINTMENT (OUTPATIENT)
Dept: OBGYN | Facility: CLINIC | Age: 48
End: 2023-06-30

## 2023-08-28 ENCOUNTER — RX RENEWAL (OUTPATIENT)
Age: 48
End: 2023-08-28

## 2023-09-22 ENCOUNTER — TRANSCRIPTION ENCOUNTER (OUTPATIENT)
Age: 48
End: 2023-09-22

## 2023-10-03 NOTE — H&P PST ADULT - BSA (M2)
1.83 Isotretinoin Pregnancy And Lactation Text: This medication is Pregnancy Category X and is considered extremely dangerous during pregnancy. It is unknown if it is excreted in breast milk.

## 2023-10-17 ENCOUNTER — RX RENEWAL (OUTPATIENT)
Age: 48
End: 2023-10-17

## 2023-10-20 ENCOUNTER — APPOINTMENT (OUTPATIENT)
Dept: FAMILY MEDICINE | Facility: CLINIC | Age: 48
End: 2023-10-20
Payer: SELF-PAY

## 2023-10-20 PROCEDURE — 99213 OFFICE O/P EST LOW 20 MIN: CPT | Mod: 95

## 2023-10-20 RX ORDER — AMOXICILLIN AND CLAVULANATE POTASSIUM 875; 125 MG/1; MG/1
875-125 TABLET, COATED ORAL
Qty: 14 | Refills: 0 | Status: DISCONTINUED | COMMUNITY
Start: 2023-04-27 | End: 2023-10-20

## 2023-10-20 RX ORDER — ACETAMINOPHEN 500 MG/1
500 TABLET, COATED ORAL
Qty: 100 | Refills: 0 | Status: DISCONTINUED | COMMUNITY
Start: 2021-07-19 | End: 2023-10-20

## 2023-10-20 RX ORDER — ACETAMINOPHEN 500 MG/1
500 TABLET ORAL
Qty: 180 | Refills: 0 | Status: DISCONTINUED | COMMUNITY
Start: 2023-04-27 | End: 2023-10-20

## 2023-10-20 RX ORDER — AZITHROMYCIN 250 MG/1
250 TABLET, FILM COATED ORAL
Qty: 1 | Refills: 0 | Status: DISCONTINUED | COMMUNITY
Start: 2023-05-08 | End: 2023-10-20

## 2023-11-22 ENCOUNTER — RX RENEWAL (OUTPATIENT)
Age: 48
End: 2023-11-22

## 2023-11-22 RX ORDER — ALBUTEROL SULFATE 90 UG/1
108 (90 BASE) INHALANT RESPIRATORY (INHALATION) EVERY 4 HOURS
Qty: 1 | Refills: 4 | Status: ACTIVE | COMMUNITY
Start: 2021-10-28 | End: 1900-01-01

## 2024-02-10 ENCOUNTER — RX RENEWAL (OUTPATIENT)
Age: 49
End: 2024-02-10

## 2024-02-10 RX ORDER — LEFLUNOMIDE 20 MG/1
20 TABLET, FILM COATED ORAL
Qty: 90 | Refills: 1 | Status: ACTIVE | COMMUNITY
Start: 2022-12-28 | End: 1900-01-01

## 2024-02-21 ENCOUNTER — APPOINTMENT (OUTPATIENT)
Dept: FAMILY MEDICINE | Facility: CLINIC | Age: 49
End: 2024-02-21
Payer: COMMERCIAL

## 2024-02-21 VITALS
DIASTOLIC BLOOD PRESSURE: 70 MMHG | TEMPERATURE: 97.8 F | BODY MASS INDEX: 28.99 KG/M2 | HEART RATE: 76 BPM | SYSTOLIC BLOOD PRESSURE: 126 MMHG | OXYGEN SATURATION: 99 % | HEIGHT: 65 IN | WEIGHT: 174 LBS

## 2024-02-21 DIAGNOSIS — S46.912A STRAIN OF UNSPECIFIED MUSCLE, FASCIA AND TENDON AT SHOULDER AND UPPER ARM LEVEL, LEFT ARM, INITIAL ENCOUNTER: ICD-10-CM

## 2024-02-21 PROCEDURE — 99214 OFFICE O/P EST MOD 30 MIN: CPT

## 2024-02-21 RX ORDER — CYCLOBENZAPRINE HYDROCHLORIDE 5 MG/1
5 TABLET, FILM COATED ORAL
Qty: 14 | Refills: 0 | Status: ACTIVE | COMMUNITY
Start: 2024-02-21 | End: 1900-01-01

## 2024-02-21 RX ORDER — PREDNISONE 5 MG/1
5 TABLET ORAL
Qty: 50 | Refills: 0 | Status: DISCONTINUED | COMMUNITY
Start: 2023-09-20 | End: 2024-02-21

## 2024-02-21 NOTE — HISTORY OF PRESENT ILLNESS
[FreeTextEntry8] : 49 yo female presents with left shoulder pain, pain is 10/10 in severity, she was at the gym on Saturday when she was lifting a bell. She felt sudden pain in shoulder. She says it has persisted. Reports difficulty lifting arm. Denies fever, chills, cp, palpitations, sob, nvcd.

## 2024-02-21 NOTE — PHYSICAL EXAM
[Normal] : no posterior cervical lymphadenopathy and no anterior cervical lymphadenopathy [de-identified] : left shoulder ROM intact, exam limited 2/2 pain, sensation intact [de-identified] : see msk

## 2024-02-21 NOTE — ASSESSMENT
[FreeTextEntry1] : Severe shoulder strain: recommend low int stretching, start medrol jesus, start cyclobenzaprine 5 mg po prn for pain, refer to orthopedist Fibromyalgia: Recommend exercise program, including aerobic conditioning, stretching, and strengthening, improved sleep hygiene RA: recommend f/u with rheumatology B/l carpal tunnel syndrome: refer to hand specialist RTC 1 wk

## 2024-03-04 ENCOUNTER — APPOINTMENT (OUTPATIENT)
Dept: FAMILY MEDICINE | Facility: CLINIC | Age: 49
End: 2024-03-04
Payer: COMMERCIAL

## 2024-03-04 VITALS
HEIGHT: 65 IN | SYSTOLIC BLOOD PRESSURE: 122 MMHG | WEIGHT: 169 LBS | DIASTOLIC BLOOD PRESSURE: 70 MMHG | OXYGEN SATURATION: 98 % | HEART RATE: 72 BPM | BODY MASS INDEX: 28.16 KG/M2

## 2024-03-04 DIAGNOSIS — Z13.220 ENCOUNTER FOR SCREENING FOR LIPOID DISORDERS: ICD-10-CM

## 2024-03-04 DIAGNOSIS — Z87.42 PERSONAL HISTORY OF OTHER DISEASES OF THE FEMALE GENITAL TRACT: ICD-10-CM

## 2024-03-04 DIAGNOSIS — Z13.21 ENCOUNTER FOR SCREENING FOR NUTRITIONAL DISORDER: ICD-10-CM

## 2024-03-04 DIAGNOSIS — R82.90 UNSPECIFIED ABNORMAL FINDINGS IN URINE: ICD-10-CM

## 2024-03-04 DIAGNOSIS — Z87.09 PERSONAL HISTORY OF OTHER DISEASES OF THE RESPIRATORY SYSTEM: ICD-10-CM

## 2024-03-04 DIAGNOSIS — F17.210 NICOTINE DEPENDENCE, CIGARETTES, UNCOMPLICATED: ICD-10-CM

## 2024-03-04 DIAGNOSIS — Z80.3 FAMILY HISTORY OF MALIGNANT NEOPLASM OF BREAST: ICD-10-CM

## 2024-03-04 DIAGNOSIS — Z12.39 ENCOUNTER FOR OTHER SCREENING FOR MALIGNANT NEOPLASM OF BREAST: ICD-10-CM

## 2024-03-04 DIAGNOSIS — F33.9 MAJOR DEPRESSIVE DISORDER, RECURRENT, UNSPECIFIED: ICD-10-CM

## 2024-03-04 DIAGNOSIS — J45.21 MILD INTERMITTENT ASTHMA WITH (ACUTE) EXACERBATION: ICD-10-CM

## 2024-03-04 DIAGNOSIS — Z00.00 ENCOUNTER FOR GENERAL ADULT MEDICAL EXAMINATION W/OUT ABNORMAL FINDINGS: ICD-10-CM

## 2024-03-04 DIAGNOSIS — H53.8 OTHER VISUAL DISTURBANCES: ICD-10-CM

## 2024-03-04 DIAGNOSIS — N83.209 UNSPECIFIED OVARIAN CYST, UNSPECIFIED SIDE: ICD-10-CM

## 2024-03-04 DIAGNOSIS — M50.90 CERVICAL DISC DISORDER, UNSPECIFIED, UNSPECIFIED CERVICAL REGION: ICD-10-CM

## 2024-03-04 DIAGNOSIS — Z13.29 ENCOUNTER FOR SCREENING FOR OTHER SUSPECTED ENDOCRINE DISORDER: ICD-10-CM

## 2024-03-04 DIAGNOSIS — Z87.19 PERSONAL HISTORY OF OTHER DISEASES OF THE DIGESTIVE SYSTEM: ICD-10-CM

## 2024-03-04 DIAGNOSIS — H66.92 OTITIS MEDIA, UNSPECIFIED, LEFT EAR: ICD-10-CM

## 2024-03-04 DIAGNOSIS — E88.09 OTHER DISORDERS OF PLASMA-PROTEIN METABOLISM, NOT ELSEWHERE CLASSIFIED: ICD-10-CM

## 2024-03-04 DIAGNOSIS — J45.20 MILD INTERMITTENT ASTHMA, UNCOMPLICATED: ICD-10-CM

## 2024-03-04 PROCEDURE — 99406 BEHAV CHNG SMOKING 3-10 MIN: CPT | Mod: NC

## 2024-03-04 PROCEDURE — 99396 PREV VISIT EST AGE 40-64: CPT

## 2024-03-04 RX ORDER — OXYCODONE AND ACETAMINOPHEN 5; 325 MG/1; MG/1
5-325 TABLET ORAL
Qty: 8 | Refills: 0 | Status: DISCONTINUED | COMMUNITY
Start: 2024-02-21 | End: 2024-03-04

## 2024-03-04 RX ORDER — METHYLPREDNISOLONE 4 MG/1
4 TABLET ORAL
Qty: 1 | Refills: 0 | Status: DISCONTINUED | COMMUNITY
Start: 2024-02-21 | End: 2024-03-04

## 2024-03-04 NOTE — ASSESSMENT
[FreeTextEntry1] : Annual physical: f/u routine labwork ADD: Recommend maintaining daily schedule, limiting distractions, use charts/checklists, c/w adderall as prescribed Anxiety: recommend exercise, yoga, meditation, c/w alprazolam prn BMI 28: Recommend low fat diet, wt loss, exercise, and DASH diet. Cervical disc disease/RA: recommend low-mod int exercise/stretching, f/u orthopedist/rheumatology Fibromyalgia: Recommend exercise program, including aerobic conditioning, stretching, and strengthening, improved sleep hygiene Mild asthma: well controlled, c/w albuterol hfa prn for sob/wheezing, advised to go to ER if condition is not resolved with hfa Depression: no suicidal/homicidal ideation, recommend exercise, yoga, meditation Non-immune Hep B: f/u titers Raynauds: Avoid cold exposure, triggers can include such things as putting one's hand in a refrigerator or freezer, holding an ice-cold drink, and entering an air conditioned environment such as the frozen food section of the supermarket or swimming in cold water. Modest seasonal changes such as cool rainy days can aggravate RP. Maintain body warmth include dressing warmly with thermal underwear, layered clothing, and a hat when going outside and arranging to have appropriate heating in both the home and working environment. Keeping the digits of the hands and feet warm with winter gloves, chemical hand warmers, and heavy wool socks may also help. Control or limitation of emotional stress because the thermoregulatory vessels are constricted by increased sympathetic tone. Avoidance of vasoconstricting drugs. Ovarian cyst: f/u gyn Blurry vision: refer to ophthalmology Fam hx of breast CA: f/u gyn for genetic counseling, f/u mammo Light cigarette use: Advised pt to discontinue smoking/use of tobacco, discussed adverse effects including but not limited to heart/lung/brain disease/CA, cessation strategies discussed, support groups/counseling offered RTC 3 wks

## 2024-03-04 NOTE — COUNSELING
[Cessation strategies including cessation program discussed] : Cessation strategies including cessation program discussed [Yes] : Risk of tobacco use and health benefits of smoking cessation discussed: Yes [Use of nicotine replacement therapies and other medications discussed] : Use of nicotine replacement therapies and other medications discussed [Encouraged to pick a quit date and identify support needed to quit] : Encouraged to pick a quit date and identify support needed to quit [No] : Not willing to quit smoking [AUDIT-C Screening administered and reviewed] : AUDIT-C Screening administered and reviewed [Potential consequences of obesity discussed] : Potential consequences of obesity discussed [Benefits of weight loss discussed] : Benefits of weight loss discussed [Encouraged to increase physical activity] : Encouraged to increase physical activity [Encouraged to maintain food diary] : Encouraged to maintain food diary [Encouraged to use exercise tracking device] : Encouraged to use exercise tracking device [FreeTextEntry1] : 5

## 2024-03-04 NOTE — HEALTH RISK ASSESSMENT
[Good] : ~his/her~  mood as  good [Yes] : Yes [Monthly or less (1 pt)] : Monthly or less (1 point) [1 or 2 (0 pts)] : 1 or 2 (0 points) [Never (0 pts)] : Never (0 points) [No] : In the past 12 months have you used drugs other than those required for medical reasons? No [0] : 1) Little interest or pleasure doing things: Not at all (0) [No falls in past year] : Patient reported no falls in the past year [PHQ-2 Negative - No further assessment needed] : PHQ-2 Negative - No further assessment needed [Patient reported mammogram was normal] : Patient reported mammogram was normal [HIV Test offered] : HIV Test offered [Patient reported PAP Smear was normal] : Patient reported PAP Smear was normal [Hepatitis C test offered] : Hepatitis C test offered [Employed] : employed [With Family] : lives with family [] :  [Feels Safe at Home] : Feels safe at home [Fully functional (bathing, dressing, toileting, transferring, walking, feeding)] : Fully functional (bathing, dressing, toileting, transferring, walking, feeding) [Fully functional (using the telephone, shopping, preparing meals, housekeeping, doing laundry, using] : Fully functional and needs no help or supervision to perform IADLs (using the telephone, shopping, preparing meals, housekeeping, doing laundry, using transportation, managing medications and managing finances) [Former] : Former [0-4] : 0-4 [de-identified] : needs improvement [Audit-CScore] : 1 [[id number]] : 0 [de-identified] : needs improvement [Sexually Active] : not sexually active [High Risk Behavior] : no high risk behavior [Reports changes in hearing] : Reports no changes in hearing [Reports changes in dental health] : Reports no changes in dental health [Reports changes in vision] : Reports no changes in vision [MammogramDate] : 01/18 [PapSmearDate] : 03/23 [de-identified] : 15 yrs 1 pack per week, currently social

## 2024-03-04 NOTE — HISTORY OF PRESENT ILLNESS
[FreeTextEntry1] : CPE [de-identified] : 47 yo female presents for annual physical. She reports significant improved pain in shoulder. She does report blurry vision. Denies fever, chills, cp, palpitations, sob, nvcd.

## 2024-03-05 ENCOUNTER — APPOINTMENT (OUTPATIENT)
Dept: OBGYN | Facility: CLINIC | Age: 49
End: 2024-03-05
Payer: COMMERCIAL

## 2024-03-05 VITALS
WEIGHT: 181.25 LBS | HEIGHT: 65 IN | BODY MASS INDEX: 30.2 KG/M2 | SYSTOLIC BLOOD PRESSURE: 132 MMHG | DIASTOLIC BLOOD PRESSURE: 80 MMHG

## 2024-03-05 DIAGNOSIS — Z12.31 ENCOUNTER FOR SCREENING MAMMOGRAM FOR MALIGNANT NEOPLASM OF BREAST: ICD-10-CM

## 2024-03-05 DIAGNOSIS — Z01.419 ENCOUNTER FOR GYNECOLOGICAL EXAMINATION (GENERAL) (ROUTINE) W/OUT ABNORMAL FINDINGS: ICD-10-CM

## 2024-03-05 PROCEDURE — 99396 PREV VISIT EST AGE 40-64: CPT

## 2024-03-05 RX ORDER — ESTRADIOL 0.5 MG/.5G
0.5 GEL TOPICAL
Qty: 30 | Refills: 5 | Status: ACTIVE | COMMUNITY
Start: 2024-03-05 | End: 1900-01-01

## 2024-03-05 RX ORDER — ETODOLAC 500 MG/1
500 TABLET, FILM COATED, EXTENDED RELEASE ORAL
Qty: 180 | Refills: 0 | Status: COMPLETED | COMMUNITY
Start: 2021-07-15 | End: 2024-03-05

## 2024-03-08 RX ORDER — ESTRADIOL 0.05 MG/D
0.05 PATCH TRANSDERMAL
Qty: 3 | Refills: 1 | Status: ACTIVE | COMMUNITY
Start: 2024-03-07

## 2024-03-11 ENCOUNTER — APPOINTMENT (OUTPATIENT)
Dept: ORTHOPEDIC SURGERY | Facility: CLINIC | Age: 49
End: 2024-03-11
Payer: COMMERCIAL

## 2024-03-11 VITALS
WEIGHT: 168 LBS | DIASTOLIC BLOOD PRESSURE: 83 MMHG | BODY MASS INDEX: 28.68 KG/M2 | HEIGHT: 64 IN | HEART RATE: 90 BPM | SYSTOLIC BLOOD PRESSURE: 119 MMHG

## 2024-03-11 DIAGNOSIS — M79.643 PAIN IN UNSPECIFIED HAND: ICD-10-CM

## 2024-03-11 PROCEDURE — 73110 X-RAY EXAM OF WRIST: CPT | Mod: 50

## 2024-03-11 PROCEDURE — 99214 OFFICE O/P EST MOD 30 MIN: CPT

## 2024-03-11 PROCEDURE — 73130 X-RAY EXAM OF HAND: CPT | Mod: 50

## 2024-03-11 NOTE — END OF VISIT
[FreeTextEntry3] : This note was written by Arpan Glover on 03/11/2024 acting solely as a scribe for Dr. Venu Garcia.   All medical record entries made by the Scribe were at my, Dr. Venu Garcia, direction and personally dictated by me on 03/11/2024. I have personally reviewed the chart and agree that the record accurately reflects my personal performance of the history, physical exam, assessment and plan.

## 2024-03-11 NOTE — DISCUSSION/SUMMARY
[FreeTextEntry1] : She has findings consistent with chronic bilateral carpal tunnel syndrome.  She also has rheumatoid arthritis and has complaints of bilateral thumb pain with minimal degenerative changes on radiographs at her CMC joints.   I had a discussion with the patient regarding today's visit, the prognosis of this diagnosis, and treatment recommendations and options. At this time, I recommended that she obtain an EMG to confirm the diagnosis and determine the severity of her condition. A script was provided. She will follow up after obtaining the EMG to discuss the results.   The patient has agreed to the above plan of management and has expressed full understanding.  All questions were fully answered to the patient's satisfaction.   My cumulative time spent on this visit included: Preparation for the visit, review of the medical records, review of pertinent diagnostic studies, examination and counseling of the patient on the above diagnosis, treatment plan and prognosis, orders of diagnostic tests, medication and/or appropriate procedures and documentation in the medical records of today's visit.

## 2024-03-11 NOTE — HISTORY OF PRESENT ILLNESS
[Right] : right hand dominant [FreeTextEntry1] : She comes in today for evaluation of bilateral pain, numbness, and tingling in her wrists that began three years ago and has been worsening recently. She notes that her symptoms are worst at night, and she often has to shake out her hands. She also notes occasional difficulty opening her hands. She's had cortisone injections in her arms bilaterally in the past, which provided some relief.   She takes leflunamide for rheumatoid arthritis.  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

## 2024-03-11 NOTE — PHYSICAL EXAM
[de-identified] :  - Constitutional: This is a female in no obvious distress.   - Psych: Patient is alert and oriented to person, place and time.  Patient has a normal mood and affect. - Cardiovascular: Normal pulses throughout the upper extremities.  No significant varicosities are noted in the upper extremities.  - Neuro: Strength and sensation are intact throughout the upper extremities.  Patient has normal coordination. - Respiratory:  Patient exhibits no evidence of shortness of breath or difficulty breathing. - Skin: No rashes, lesions, or other abnormalities are noted in the upper extremities. ---  Examination of both wrists and hands demonstrates no obvious swelling or evidence of synovitis.  She has tenderness along the CMC joints bilaterally.  There is no evidence of de Quervain's tendinitis or trigger thumb or trigger finger.  Provocative signs for carpal tunnel syndrome are positive bilaterally.  She has intact sensation to light touch bilaterally along the radial, ulnar and median nerve distributions. [de-identified] : PA, lateral, and oblique radiographs of the left and right hands demonstrate minimal narrowing of the CMC joints of the thumbs bilaterally. There is no radiographic evidence of RA of the hands and wrists.

## 2024-03-11 NOTE — ADDENDUM
[FreeTextEntry1] : I, Arpan Glover, acted solely as a scribe for Dr. Garcia on this date on 03/11/2024.

## 2024-03-14 DIAGNOSIS — Z78.9 OTHER SPECIFIED HEALTH STATUS: ICD-10-CM

## 2024-03-14 LAB
25(OH)D3 SERPL-MCNC: 34.5 NG/ML
ALBUMIN SERPL ELPH-MCNC: 4.1 G/DL
ALP BLD-CCNC: 64 U/L
ALT SERPL-CCNC: 14 U/L
ANION GAP SERPL CALC-SCNC: 10 MMOL/L
APPEARANCE: CLEAR
AST SERPL-CCNC: 14 U/L
BACTERIA: NEGATIVE /HPF
BASOPHILS # BLD AUTO: 0.06 K/UL
BASOPHILS NFR BLD AUTO: 0.8 %
BILIRUB SERPL-MCNC: 0.2 MG/DL
BILIRUBIN URINE: NEGATIVE
BLOOD URINE: NEGATIVE
BUN SERPL-MCNC: 13 MG/DL
C TRACH RRNA SPEC QL NAA+PROBE: NOT DETECTED
CALCIUM SERPL-MCNC: 9.4 MG/DL
CAST: 0 /LPF
CHLORIDE SERPL-SCNC: 104 MMOL/L
CHOLEST SERPL-MCNC: 172 MG/DL
CO2 SERPL-SCNC: 25 MMOL/L
COLOR: YELLOW
CREAT SERPL-MCNC: 0.71 MG/DL
EGFR: 104 ML/MIN/1.73M2
EOSINOPHIL # BLD AUTO: 0.24 K/UL
EOSINOPHIL NFR BLD AUTO: 3.2 %
EPITHELIAL CELLS: 5 /HPF
ESTIMATED AVERAGE GLUCOSE: 103 MG/DL
GLUCOSE QUALITATIVE U: NEGATIVE MG/DL
GLUCOSE SERPL-MCNC: 107 MG/DL
HBA1C MFR BLD HPLC: 5.2 %
HBV CORE IGG+IGM SER QL: NONREACTIVE
HBV CORE IGM SER QL: NONREACTIVE
HBV E AB SER QL: NONREACTIVE
HBV E AG SER QL: NONREACTIVE
HBV SURFACE AB SER QL: NONREACTIVE
HBV SURFACE AG SER QL: NONREACTIVE
HCT VFR BLD CALC: 42.5 %
HCV AB SER QL: NONREACTIVE
HCV S/CO RATIO: 0.09 S/CO
HDLC SERPL-MCNC: 72 MG/DL
HGB BLD-MCNC: 14.1 G/DL
HIV1+2 AB SPEC QL IA.RAPID: NONREACTIVE
IMM GRANULOCYTES NFR BLD AUTO: 0.3 %
KETONES URINE: NEGATIVE MG/DL
LDLC SERPL CALC-MCNC: 77 MG/DL
LEUKOCYTE ESTERASE URINE: NEGATIVE
LYMPHOCYTES # BLD AUTO: 1.45 K/UL
LYMPHOCYTES NFR BLD AUTO: 19.1 %
MAN DIFF?: NORMAL
MCHC RBC-ENTMCNC: 31.1 PG
MCHC RBC-ENTMCNC: 33.2 GM/DL
MCV RBC AUTO: 93.8 FL
MICROSCOPIC-UA: NORMAL
MONOCYTES # BLD AUTO: 0.7 K/UL
MONOCYTES NFR BLD AUTO: 9.2 %
N GONORRHOEA RRNA SPEC QL NAA+PROBE: NOT DETECTED
NEUTROPHILS # BLD AUTO: 5.14 K/UL
NEUTROPHILS NFR BLD AUTO: 67.4 %
NITRITE URINE: NEGATIVE
NONHDLC SERPL-MCNC: 101 MG/DL
PH URINE: 7
PLATELET # BLD AUTO: 248 K/UL
POTASSIUM SERPL-SCNC: 4.5 MMOL/L
PROT SERPL-MCNC: 6.4 G/DL
PROTEIN URINE: NEGATIVE MG/DL
RBC # BLD: 4.53 M/UL
RBC # FLD: 13 %
RED BLOOD CELLS URINE: 1 /HPF
SODIUM SERPL-SCNC: 139 MMOL/L
SOURCE AMPLIFICATION: NORMAL
SPECIFIC GRAVITY URINE: 1.02
T PALLIDUM AB SER QL IA: NEGATIVE
TRIGL SERPL-MCNC: 141 MG/DL
TSH SERPL-ACNC: 1.34 UIU/ML
UROBILINOGEN URINE: 0.2 MG/DL
WBC # FLD AUTO: 7.61 K/UL
WHITE BLOOD CELLS URINE: 0 /HPF

## 2024-03-25 ENCOUNTER — RESULT REVIEW (OUTPATIENT)
Age: 49
End: 2024-03-25

## 2024-03-25 ENCOUNTER — APPOINTMENT (OUTPATIENT)
Dept: RHEUMATOLOGY | Facility: CLINIC | Age: 49
End: 2024-03-25
Payer: COMMERCIAL

## 2024-03-25 VITALS
WEIGHT: 168 LBS | SYSTOLIC BLOOD PRESSURE: 141 MMHG | HEART RATE: 92 BPM | OXYGEN SATURATION: 98 % | DIASTOLIC BLOOD PRESSURE: 92 MMHG | BODY MASS INDEX: 28.68 KG/M2 | HEIGHT: 64 IN | TEMPERATURE: 97.7 F

## 2024-03-25 DIAGNOSIS — M05.79 RHEUMATOID ARTHRITIS WITH RHEUMATOID FACTOR OF MULTIPLE SITES W/OUT ORGAN OR SYSTEMS INVOLVEMENT: ICD-10-CM

## 2024-03-25 DIAGNOSIS — I73.00 RAYNAUD'S SYNDROME W/OUT GANGRENE: ICD-10-CM

## 2024-03-25 PROCEDURE — 99214 OFFICE O/P EST MOD 30 MIN: CPT

## 2024-03-25 PROCEDURE — G2211 COMPLEX E/M VISIT ADD ON: CPT

## 2024-03-25 NOTE — ASSESSMENT
[FreeTextEntry1] : 49 y/o  female presents as follow up for RA.  In 2/2021, Patient developed intermittent progressive to constant pain of b/l shoulders, elbows, wrists, PIP/DIPs, hips without swelling/redness. Worse in AM with AM stiffness x 25 mins. Neck pain radiating to bilateral upper extremities to the elbows. Low back pain radiating to the right lower extremity to knee. Pt was treated with oxycodone. Pt found to have positive RF and referred to rheumatology.  Pt also reports sleep disturbance, fatigue. Denies snoring. Reports Raynaud's with cold exposure (fingers turn white, blue, red with paresthesias, occurs ~3x/day). No family Hx of CTD.  Patient was deemed to have clinical signs of RA with positive RF, CCP and was sent for bloodwork and XRs.   Pt was diagnosed with RA by me and started on MTX and PDN taper in 11/2021 which improved her symptoms, but ran out and had difficulty due to complexity of directions.  Pt was started on LEF by me in 12/2022 with potential headaches, diarrhea, insomnia as side effect but with great improvement in her joint pains. Humira started on 3/2023 but reported episodes of SOB, tinnitus which she was concerned was side effect of Humira. Tinnitus resolved spontaneously. Humira was stopped after 5-6 months without any improvement.  Pt was recently seen by ortho hand for b/l CTS seen on recent EMG. Pt reports other pains in b/l shoulders, knees, ankles, not clearly inflammatory in nature. It is important at this time to see if pt's polyarthralgia are mechanical or active RA (which will require additional DMARD)  - Obtain labwork for RA activity, medication safety - Obtain XR b/l shoulders, knees, ankles. - Obtain US b/l shoulders - c/w LEF 20mg PO daily. Side effects of LEF were discussed with the patient in detail including but not limited to GI upset, HTN, hepatotoxicity, and cytopenias.  This is a high-risk therapy requiring intense monitoring for toxicity. Will evaluate with frequent monitoring of BP, CBC, and LFTs.  - Last Hep B/C and Quantiferon TB negative 12/2022. Repeat today. - Will consider shoulder steroid injection on next visit depending on US results and pt's symptoms. - Pt to follow up with Dr. Garcia to discuss potential interventions for her CTS. - Will contact pt with US results. RTC 2 months for follow up

## 2024-03-25 NOTE — HISTORY OF PRESENT ILLNESS
[FreeTextEntry1] : HISTORY:  47 y/o  female presents as follow up for RA.  In 2/2021, Patient developed intermittent progressive to constant pain of b/l shoulders, elbows, wrists, PIP/DIPs, hips without swelling/redness. Worse in AM with AM stiffness x 25 mins. Neck pain radiating to bilateral upper extremities to the elbows. Low back pain radiating to the right lower extremity to knee. Pt was treated with oxycodone. Pt found to have positive RF and referred to rheumatology.  Pt also reports sleep disturbance, fatigue. Denies snoring. Reports Raynaud's with cold exposure (fingers turn white, blue, red with paresthesias, occurs ~3x/day). No family Hx of CTD.  Patient was deemed to have clinical signs of RA with positive RF, CCP and was sent for bloodwork and XRs.   Pt was diagnosed with RA by me and started on MTX and PDN taper in 11/2021 which improved her symptoms, but ran out and had difficulty due to complexity of directions.  Pt was started on LEF by me in 12/2022 with potential headaches, diarrhea, insomnia as side effect but with near resolution of her joint pains.   INTERVAL HISTORY:  Last seen 3/2023. Humira started on 3/2023 but reported episodes of SOB, tinnitus which she was concerned was side effect of Humira. Tinnitus resolved spontaneously. Humira was stopped after 5-6 months without any improvement. Pt continues on LEF. Pt was recently seen by ortho hand for b/l CTS seen on recent EMG. Pt reports other pains in b/l shoulders, knees, ankles, not clearly inflammatory in nature.  WORKUP:  Remarkable for (7/2021 - 12/2022): , CCP+, Vit D 25-OH 26.8  Normal/neg (7/2021 - 12/2022): CBC, CMP, SAMIRA, Hep B/C panel, Syphilis, Chlamydia/GC, HIV, Quantiferon TB  XR b/l hands (3/2024): B/L 1st CMC OA. No signs of long term RA changes  XR b/l shoulders (11/2021): Normal   XR C-Spine (11/2021): Multilevel DJD  MR C-spine (5/2022): Advanced L sided facet arthrosis with perifacet edema and C3-C4. Moderate to severe L C3-C4 foraminal narrowing. Diffuse disc osteophyte complexes at C5-C6 and C6-C7 with mild spinal canal and moderate b/l foraminal narrowing.

## 2024-03-25 NOTE — PHYSICAL EXAM
[TextEntry] : GENERAL: Appears in no acute distress HEENT: EOMI, PERRLA. No conjunctival erythema. Moist mucous membranes. No nasopharyngeal ulcers NECK: Supple, no cervical lymphadenopathy, no thyromegaly CARDIOVASCULAR: RRR. S1, S2 auscultated. No murmurs or rubs. PULMONARY: Clear to auscultation b/l, no wheezes, rales, or crackles ABDOMINAL: Soft, nontender, nondistended. Bowel sounds present. No organomegaly. MSK: Generalized swelling and exquisite tenderness to palpation of b/l MCPs, PIPs, wrists, L elbow. No deformities. Bilateral knee crepitus. SKIN: No lesions or rashes NEURO: No focal deficits. PSYCH: AAOx3.

## 2024-03-26 ENCOUNTER — APPOINTMENT (OUTPATIENT)
Dept: ORTHOPEDIC SURGERY | Facility: CLINIC | Age: 49
End: 2024-03-26
Payer: COMMERCIAL

## 2024-03-26 LAB
ALBUMIN SERPL ELPH-MCNC: 4.4 G/DL
ALP BLD-CCNC: 59 U/L
ALT SERPL-CCNC: 12 U/L
ANION GAP SERPL CALC-SCNC: 10 MMOL/L
AST SERPL-CCNC: 15 U/L
BILIRUB SERPL-MCNC: 0.3 MG/DL
BUN SERPL-MCNC: 15 MG/DL
CALCIUM SERPL-MCNC: 9.5 MG/DL
CHLORIDE SERPL-SCNC: 104 MMOL/L
CO2 SERPL-SCNC: 24 MMOL/L
CREAT SERPL-MCNC: 0.71 MG/DL
CRP SERPL-MCNC: <3 MG/L
EGFR: 104 ML/MIN/1.73M2
ERYTHROCYTE [SEDIMENTATION RATE] IN BLOOD BY WESTERGREN METHOD: 14 MM/HR
GLUCOSE SERPL-MCNC: 105 MG/DL
HBV CORE IGG+IGM SER QL: NONREACTIVE
HBV SURFACE AB SER QL: NONREACTIVE
HBV SURFACE AG SER QL: NONREACTIVE
HCT VFR BLD CALC: 43.5 %
HCV AB SER QL: NONREACTIVE
HCV S/CO RATIO: 0.11 S/CO
HGB BLD-MCNC: 14.6 G/DL
MCHC RBC-ENTMCNC: 30.3 PG
MCHC RBC-ENTMCNC: 33.6 GM/DL
MCV RBC AUTO: 90.2 FL
PLATELET # BLD AUTO: 249 K/UL
POTASSIUM SERPL-SCNC: 4.6 MMOL/L
PROT SERPL-MCNC: 7 G/DL
RBC # BLD: 4.82 M/UL
RBC # FLD: 13.3 %
SODIUM SERPL-SCNC: 138 MMOL/L
WBC # FLD AUTO: 7 K/UL

## 2024-03-26 PROCEDURE — 99214 OFFICE O/P EST MOD 30 MIN: CPT

## 2024-03-26 NOTE — ADDENDUM
[FreeTextEntry1] : I, Arpan Glover, acted solely as a scribe for Dr. Garcia on this date on 03/26/2024.

## 2024-03-26 NOTE — PHYSICAL EXAM
[de-identified] :  - Constitutional: This is a female in no obvious distress.   - Psych: Patient is alert and oriented to person, place and time.  Patient has a normal mood and affect. - Cardiovascular: Normal pulses throughout the upper extremities.  No significant varicosities are noted in the upper extremities.  - Neuro: Strength and sensation are intact throughout the upper extremities.  Patient has normal coordination. - Respiratory:  Patient exhibits no evidence of shortness of breath or difficulty breathing. - Skin: No rashes, lesions, or other abnormalities are noted in the upper extremities. ---  Examination of both wrists and hands demonstrates no obvious swelling or evidence of synovitis.  She has tenderness along the CMC joints bilaterally.  There is no evidence of de Quervain's tendinitis or trigger thumb or trigger finger.  Provocative signs for carpal tunnel syndrome are positive bilaterally.  She has intact sensation to light touch bilaterally along the radial, ulnar and median nerve distributions.  Examination to her right elbow demonstrates no obvious swelling.  She is quite tender along the lateral epicondyle.  There is no localized swelling or tenderness along the lateral joint line.  There is no pain to resisted wrist extension.  She has full motion. [de-identified] : PA, lateral, and oblique radiographs of the left and right hands dated 3/11/2024 demonstrated minimal narrowing of the CMC joints of the thumbs bilaterally. There is no radiographic evidence of RA of the hands and wrists.

## 2024-03-26 NOTE — DISCUSSION/SUMMARY
[FreeTextEntry1] : I reviewed the EMG results with her.  I had a discussion regarding today's visit, the diagnosis and treatment recommendations and options.  We also discussed changes since the last visit.  At this time, we discussed her options, including cortisone injections or surgery. She opted for surgery at this time, and expressed interest in having both carpal tunnel releases done at the same time.  She has deferred cortisone injections, as she states that she has had symptoms chronically which are worsening.  She will speak with our surgical scheduler to schedule her appointment.  -  The nature and purposes of endoscopic carpal tunnel release was discussed in detail with the patient. We discussed the surgical procedure in detail, as well as expected postoperative recovery and outcome. -  Possible risks, benefits and complications (from known and unknown causes) of the procedure were discussed in detail. -  Possible non-operative alternatives to the proposed treatment were discussed in detail. -  I discussed with the patient that I will be performing an endoscopic carpal tunnel release. We discussed both open and endoscopic carpal tunnel release, and the associated risks and benefits of each of these procedures. The patient understands that it is possible that the endoscopic carpal tunnel release may need to be converted to an open release during the procedure, if the carpal tunnel cannot be well visualized or safely released endoscopically. In addition, the patient does understand that, based upon some of the hand surgery literature, there is a slightly higher risk of iatrogenic nerve or vessel injury associated with endoscopic carpal tunnel release, as compared to open carpal tunnel release. -  She was also told that other possible risks/complications include, but are not limited to:  Infection, nerve or vessel injury, stiffness, painful scar, poor outcome, need for additional surgical procedures, and other unforeseen complications. -  In addition, the possibility of an "unsuccessful outcome," despite "successful surgery," was discussed with the patient.  The patient understands that nerve recovery after surgical release can be unpredictable, and there are no "guarantees" that the preoperative symptoms will completely resolve. -  The patient fully understands these risks and wishes to proceed. -  I had a lengthy discussion with the patient regarding today's visit, the diagnosis, and my surgical treatment recommendations.  The patient has agreed to this plan of management and has expressed full understanding.  All questions were fully answered to the patient's satisfaction.  Regarding her chronic right elbow pain secondary to lateral epicondylitis, I recommended that she obtain an MRI to better evaluate her symptoms.  She has been undergoing treatment by another physician and has had a cortisone injection without relief.  She has had symptoms for greater than 2 years.. A referral was provided. She will follow up after the MRI to discuss the results and treatment options.  The patient has agreed to the above plan of management and has expressed full understanding.  All questions were fully answered to the patient's satisfaction.  My cumulative time spent on today's visit was greater than 30 minutes and included: Preparation for the visit, review of the medical records, review of pertinent diagnostic studies, examination and counseling of the patient on the above diagnosis, treatment plan and prognosis, orders of diagnostic tests, medications and/or appropriate procedures and documentation in the medical records of today's visit.

## 2024-03-26 NOTE — HISTORY OF PRESENT ILLNESS
[FreeTextEntry1] : Follow-up regarding bilateral carpal tunnel syndrome.  See note from when she was seen in the office 2 weeks ago.  I ordered EMGs.  She comes in to review the results and discuss treatment recommendations.  She also complains of burning and stiffness in her right elbow secondary to right lateral epicondylitis. She had a cortisone injection in that elbow two years ago.  I reviewed EMGs dated 3/20/2024.  This demonstrated: Moderate left and mild right carpal tunnel syndrome.  She takes leflunamide for rheumatoid arthritis.  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

## 2024-03-29 LAB
M TB IFN-G BLD-IMP: NEGATIVE
QUANTIFERON TB PLUS MITOGEN MINUS NIL: >10 IU/ML
QUANTIFERON TB PLUS NIL: 0.07 IU/ML
QUANTIFERON TB PLUS TB1 MINUS NIL: 0.03 IU/ML
QUANTIFERON TB PLUS TB2 MINUS NIL: 0.04 IU/ML

## 2024-04-02 RX ORDER — CELECOXIB 200 MG/1
200 CAPSULE ORAL TWICE DAILY
Qty: 20 | Refills: 0 | Status: ACTIVE | COMMUNITY
Start: 2024-04-02 | End: 1900-01-01

## 2024-04-05 ENCOUNTER — RESULT REVIEW (OUTPATIENT)
Age: 49
End: 2024-04-05

## 2024-04-05 ENCOUNTER — APPOINTMENT (OUTPATIENT)
Dept: ULTRASOUND IMAGING | Facility: CLINIC | Age: 49
End: 2024-04-05
Payer: COMMERCIAL

## 2024-04-05 ENCOUNTER — APPOINTMENT (OUTPATIENT)
Dept: RADIOLOGY | Facility: CLINIC | Age: 49
End: 2024-04-05
Payer: COMMERCIAL

## 2024-04-05 ENCOUNTER — APPOINTMENT (OUTPATIENT)
Dept: MRI IMAGING | Facility: CLINIC | Age: 49
End: 2024-04-05
Payer: COMMERCIAL

## 2024-04-05 ENCOUNTER — APPOINTMENT (OUTPATIENT)
Dept: MAMMOGRAPHY | Facility: CLINIC | Age: 49
End: 2024-04-05
Payer: COMMERCIAL

## 2024-04-05 PROCEDURE — 77067 SCR MAMMO BI INCL CAD: CPT

## 2024-04-05 PROCEDURE — 76881 US COMPL JOINT R-T W/IMG: CPT | Mod: RT

## 2024-04-05 PROCEDURE — 77063 BREAST TOMOSYNTHESIS BI: CPT

## 2024-04-05 PROCEDURE — 76641 ULTRASOUND BREAST COMPLETE: CPT | Mod: 50

## 2024-04-05 PROCEDURE — 73610 X-RAY EXAM OF ANKLE: CPT | Mod: 50

## 2024-04-05 PROCEDURE — 73030 X-RAY EXAM OF SHOULDER: CPT | Mod: 50

## 2024-04-05 PROCEDURE — 73221 MRI JOINT UPR EXTREM W/O DYE: CPT | Mod: RT

## 2024-04-05 PROCEDURE — 73562 X-RAY EXAM OF KNEE 3: CPT | Mod: 50

## 2024-04-10 ENCOUNTER — NON-APPOINTMENT (OUTPATIENT)
Age: 49
End: 2024-04-10

## 2024-04-23 RX ORDER — GABAPENTIN 100 MG/1
100 CAPSULE ORAL
Qty: 40 | Refills: 0 | Status: ACTIVE | COMMUNITY
Start: 2024-04-02 | End: 1900-01-01

## 2024-04-23 RX ORDER — GABAPENTIN 100 MG/1
100 CAPSULE ORAL
Qty: 120 | Refills: 0 | Status: ACTIVE | COMMUNITY
Start: 2024-04-23 | End: 1900-01-01

## 2024-05-07 ENCOUNTER — APPOINTMENT (OUTPATIENT)
Dept: RHEUMATOLOGY | Facility: CLINIC | Age: 49
End: 2024-05-07

## 2024-05-14 ENCOUNTER — NON-APPOINTMENT (OUTPATIENT)
Age: 49
End: 2024-05-14

## 2024-05-15 ENCOUNTER — APPOINTMENT (OUTPATIENT)
Age: 49
End: 2024-05-15

## 2024-05-15 DIAGNOSIS — Z79.899 OTHER LONG TERM (CURRENT) DRUG THERAPY: ICD-10-CM

## 2024-05-15 DIAGNOSIS — Z82.49 FAMILY HISTORY OF ISCHEMIC HEART DISEASE AND OTHER DISEASES OF THE CIRCULATORY SYSTEM: ICD-10-CM

## 2024-05-15 DIAGNOSIS — Z83.3 FAMILY HISTORY OF DIABETES MELLITUS: ICD-10-CM

## 2024-05-15 DIAGNOSIS — Z80.1 FAMILY HISTORY OF MALIGNANT NEOPLASM OF TRACHEA, BRONCHUS AND LUNG: ICD-10-CM

## 2024-05-15 DIAGNOSIS — G56.03 CARPAL TUNNEL SYNDROM,BILATERAL UPPER LIMBS: ICD-10-CM

## 2024-05-15 DIAGNOSIS — M54.12 RADICULOPATHY, CERVICAL REGION: ICD-10-CM

## 2024-05-15 DIAGNOSIS — F41.9 ANXIETY DISORDER, UNSPECIFIED: ICD-10-CM

## 2024-05-15 DIAGNOSIS — M77.11 LATERAL EPICONDYLITIS, RIGHT ELBOW: ICD-10-CM

## 2024-05-15 RX ORDER — PANTOPRAZOLE 40 MG/1
40 TABLET, DELAYED RELEASE ORAL DAILY
Qty: 30 | Refills: 1 | Status: DISCONTINUED | COMMUNITY
Start: 2023-02-02 | End: 2024-05-15

## 2024-05-20 ENCOUNTER — TRANSCRIPTION ENCOUNTER (OUTPATIENT)
Age: 49
End: 2024-05-20

## 2024-05-20 NOTE — PRE PROCEDURE NOTE - PRE PROCEDURE EVALUATION
Reason For Visit       REMY DAN is being seen for a for Carpal Tunnel Syndrome.  ?  History of Present Illness       49 yo female reports 2 year history of bilateral wrist pain with numbness and tingling in all fingers.  ?  Active Problems  ADD (attention deficit disorder) (314.00) (F98.8)  Anxiety (300.00) (F41.9)  Arthralgia (719.40) (M25.50)  Bicipital tendinitis, left (726.12) (M75.22)  Bicipital tendinitis, right (726.12) (M75.21)  Bilateral carpal tunnel syndrome (354.0) (G56.03)  Blurry vision (368.8) (H53.8)  BMI 28.0-28.9,adult (V85.24) (Z68.28)  BMI 32.0-32.9,adult (V85.32) (Z68.32)  Breast cancer screening (V76.10) (Z12.39)  Carpal tunnel syndrome of left wrist (354.0) (G56.02)  Carpal tunnel syndrome of right wrist (354.0) (G56.01)  Cervical cancer screening (V76.2) (Z12.4)  Cervical disc disease (722.91) (M50.90)  Cervical radiculopathy (723.4) (M54.12)  Chronic bilateral low back pain with right-sided sciatica (724.2,724.3,338.29)  (M54.41,G89.29)  Chronic major depressive disorder, recurrent episode (296.30) (F33.9)  Colon cancer screening (V76.51) (Z12.11)  Contraception management (V25.9) (Z30.9)  Deltoid tendinitis, right (726.10) (M77.8)  Dense breast tissue (793.82) (R92.30)  Disc degeneration, lumbar (722.52) (M51.36)  Encounter for immunization (V03.89) (Z23)  Encounter for screening laboratory testing for COVID-19 virus (V01.79) (Z11.52)  Encounter for vitamin deficiency screening (V77.99) (Z13.21)  Fibromyalgia (729.1) (M79.7)  Hot flashes, menopausal (627.2) (N95.1)  Inflammatory arthritis (714.9) (M19.90)  IUD check up (V25.42) (Z30.431)  Lateral epicondylitis of right elbow (726.32) (M77.11)  Light cigarette smoker (305.1) (F17.210)  Long-term use of adalimumab (V58.69) (Z79.620)  Lumbar spondylosis (721.3) (M47.816)  Lumbosacral radiculopathy (724.4) (M54.17)  Menometrorrhagia (626.2) (N92.1)  Mild intermittent asthma with acute exacerbation (493.92) (J45.21)  Mild intermittent asthma without complication (493.90) (J45.20)  Neck pain (723.1) (M54.2)  Nonimmune to hepatitis B virus (V49.89) (Z78.9)  On long term leflunomide therapy (V58.69) (Z79.899)  Perimenopausal (627.2) (N95.1)  Polyarthritis (716.50) (M13.0)  Pre-operative clearance (V72.84) (Z01.818)  Raynaud's syndrome without gangrene (443.0) (I73.00)  Rheumatoid arthritis involving multiple sites with positive rheumatoid factor (714.0)  (M05.79)  Right shoulder pain (719.41) (M25.511)  S/P dilatation and curettage (V45.89) (Z98.890)  Screen for STD (sexually transmitted disease) (V74.5) (Z11.3)  Screening for HIV (human immunodeficiency virus) (V73.89) (Z11.4)  Screening for hyperlipidemia (V77.91) (Z13.220)  Screening for hypothyroidism (V77.0) (Z13.29)  Screening for STD (sexually transmitted disease) (V74.5) (Z11.3)  Simple ovarian cyst (620.2) (N83.209)  Spinal instability, lumbar (724.9) (M53.2X6)  Strain of shoulder, left (840.9) (S46.912A)  Subacromial bursitis of right shoulder joint (726.19) (M75.51)  Visit for screening mammogram (V76.12) (Z12.31)  Well woman exam with routine gynecological exam (V72.31) (Z01.419)       ?  Past Medical History  History of Abnormal urinalysis (791.9) (R82.90)  History of Acute otitis media, left (382.9) (H66.92)  Acute sinusitis (461.9) (J01.90)  Acute URI (465.9) (J06.9)  History of Cervical cancer screening (V76.2) (Z12.4)  History of COVID-19 virus infection (079.89) (U07.1)  History of Generalized Anxiety Disorder (V11.2)  History of Hand pain (729.5) (M79.643)  History of abnormal uterine bleeding (V13.29) (Z87.42)  History of attention deficit hyperactivity disorder (ADHD) (V11.8) (Z86.59)  History of back pain (V13.59) (Z87.39)  History of cough  History of depression (V11.8) (Z86.59)  History of gastric ulcer (V12.79) (Z87.11)  History of gastritis (V12.79) (Z87.19)  History of irregular menstrual cycles (V13.29) (Z87.42)  History of leukocytosis (V12.3) (Z86.2)  History of low back pain (V13.59) (Z87.39)  History of recent dental procedure (V15.29) (Z98.890)  History of rheumatoid arthritis (V13.4) (Z87.39)  History of sore throat (V12.69) (Z87.09)  History of vaginal discharge (V13.29) (Z87.42)  History of Last menstrual period (LMP) > 10 days ago (V49.89) (Z78.9)  Personal history of asthma (V12.69) (Z87.09)  Pharyngitis due to Streptococcus pyogenes (034.0) (J02.0)  History of Reflux esophagitis (530.11) (K21.00)  History of Screening for diabetes mellitus (DM) (V77.1) (Z13.1)  History of Serum albumin decreased (273.8) (E88.09)  History of Vaginal dryness (625.8) (N89.8)       Surgical History  History of  Section  History of  Section       ?  Family History  Family history of diabetes mellitus (V18.0) (Z83.3) : Father  Family history of hypertension (V17.49) (Z82.49) : Father  Family history of lung cancer (V16.1) (Z80.1) : Mother  Family history of malignant neoplasm of breast (V16.3) (Z80.3) : Mother       Social History  Born in [place]  Completed high school  History of Current some day smoker (305.1) (F17.200)  Currently sexually active   (V61.03) (Z63.5)  Does not use illicit drugs (V49.89) (Z78.9)  Exercises occasionally (V49.89) (Z78.9)  Has 2 children  Light cigarette smoker (305.1) (F17.210)  No alcohol use  No illicit drug use  No pets in home  No recent domestic travel  No recent foreign travel  Non-smoker (V49.89) (Z78.9)  Occupation  Physically inactive (V69.0) (Z72.3)  Social alcohol use (V49.89) (Z78.9)  History of Tobacco use (305.1) (Z72.0)       ?  Current Meds  Albuterol Sulfate (2.5 MG/3ML) 0.083% Inhalation Nebulization Solution; USE 1 UNIT  DOSE EVERY 4-6 HOURS AS NEEDED FOR WHEEZING  Albuterol Sulfate  (90 Base) MCG/ACT Inhalation Aerosol Solution; INHALE 1  PUFF BY MOUTH EVERY 4 HOURS AS NEEDED  ALPRAZolam 0.5 MG Oral Tablet; TAKE 0.5 TABLET Twice daily MDD:2 tabs  Amphetamine-Dextroamphetamine 30 MG Oral Tablet; TAKE 1 TABLET DAILY AS  DIRECTED. MDD:1 tab  Celecoxib 200 MG Oral Capsule; TAKE 1 CAPSULE TWICE DAILY WITH FOOD  Cyclobenzaprine HCl - 5 MG Oral Tablet; TAKE 1 TABLET AT BEDTIME AS NEEDED  Estradiol 0.05 MG/24HR Transdermal Patch Weekly; APPLY 1 PATCH WEEKLY AS  DIRECTED  Estradiol 0.5 MG/0.5GM Transdermal Gel; apply one packet to each thigh daily  Fluticasone Propionate 50 MCG/ACT Nasal Suspension; SPRAY 1 SPRAY INTO EACH  NOSTRIL TWICE A DAY  Gabapentin 100 MG Oral Capsule; 1 CAPSULE BY MOUTH 3 TIMES A DAY AS NEEDED  FOR PAIN  Gabapentin 100 MG Oral Capsule; take one tablet twice a day with food  Gabapentin 100 MG Oral Capsule; take two caps twice a day for 30 days  Humira Pen 40 MG/0.4ML Subcutaneous Pen-injector Kit; INJECT 40 MG  SUBCUTANEOUSLY ONCE EVERY 2 WEEKS  Leflunomide 20 MG Oral Tablet; TAKE 1 TABLET BY MOUTH EVERY DAY  Pantoprazole Sodium 40 MG Oral Tablet Delayed Release; TAKE 1 TABLET DAILY  RA Calcium 500 MG Oral Tablet; on P.o. t.i.d.At end of meals  Sharps Container; For disposal of Humira injections       ?  Allergies  No Known Drug Allergies       ?  Review of Systems          Hand Dominance: right.    Musculoskeletal: no arthralgia, no joint pain, no joint stiffness and no joint swelling . bilateral wrist pain with numbness in all fingers; pain right elbow.    Constitutional: no fever, no chills, no fatigue and no recent weight gain.    Eyes: no vision problems.    ENT: no decrease in hearing, no nasal discharge, no nosebleeds and no sore throat.    Cardiovascular: no chest pain, no palpitations, not tachycardic and no lower extremity edema.    Respiratory: no dyspnea, no cough, no shortness of breath during exertion and no wheezing.    Gastrointestinal: no abdominal pain, no constipation, no heartburn and no reflux.    Genitourinary: no dysuria and no incontinence . LMP 3 ya.    Integumentary: no skin lesions.    Neurological: no headache, no dizziness, no fainting and no convulsions.    Psychiatric: anxiety, but no depression and not suicidal. no sleep disturbances    Endocrine: no feeling weak, no muscle weakness, no hot flashes and no deepening of the voice.    Hematologic/Lymphatic: no tendency for easy bleeding, no tendency for easy bruising and no swollen glands. no cervical adenopathy    Allergic/Immunologic review of systems are otherwise negative except as noted in HPI.  ?  Assessment  ADD (attention deficit disorder) (314.00) (F98.8)  Anxiety (300.00) (F41.9)  Bilateral carpal tunnel syndrome (354.0) (G56.03)  Carpal tunnel syndrome of left wrist (354.0) (G56.02)  Carpal tunnel syndrome of right wrist (354.0) (G56.01)  Cervical radiculopathy (723.4) (M54.12)  Lateral epicondylitis of right elbow (726.32) (M77.11)  On long term leflunomide therapy (V58.69) (Z79.899)  History of  Section  History of  Section  Family history of diabetes mellitus (V18.0) (Z83.3) : Father  Family history of hypertension (V17.49) (Z82.49) : Father  Family history of lung cancer (V16.1) (Z80.1) : Mother  No illicit drug use  Endoscopic bilateral carpal tunnel release is planned for 2024  Patient will have PE on admission  Pre op instructions were reviewed including medication instructions and chlorhexadine soap  The patient does not require medical clearance or urine pregnancy testing on admission  Best wishes offered.          ?  Plan  Stop: Pantoprazole Sodium 40 MG Oral Tablet Delayed Release       ? Reason For Visit       REMY DAN is being seen for a for Carpal Tunnel Syndrome.  ?  History of Present Illness       47 yo female reports 2 year history of bilateral wrist pain with numbness and tingling in all fingers.  ?  Active Problems  ADD (attention deficit disorder) (314.00) (F98.8)  Anxiety (300.00) (F41.9)  Arthralgia (719.40) (M25.50)  Bicipital tendinitis, left (726.12) (M75.22)  Bicipital tendinitis, right (726.12) (M75.21)  Bilateral carpal tunnel syndrome (354.0) (G56.03)  Blurry vision (368.8) (H53.8)  BMI 28.0-28.9,adult (V85.24) (Z68.28)  BMI 32.0-32.9,adult (V85.32) (Z68.32)  Breast cancer screening (V76.10) (Z12.39)  Carpal tunnel syndrome of left wrist (354.0) (G56.02)  Carpal tunnel syndrome of right wrist (354.0) (G56.01)  Cervical cancer screening (V76.2) (Z12.4)  Cervical disc disease (722.91) (M50.90)  Cervical radiculopathy (723.4) (M54.12)  Chronic bilateral low back pain with right-sided sciatica (724.2,724.3,338.29)  (M54.41,G89.29)  Chronic major depressive disorder, recurrent episode (296.30) (F33.9)  Colon cancer screening (V76.51) (Z12.11)  Contraception management (V25.9) (Z30.9)  Deltoid tendinitis, right (726.10) (M77.8)  Dense breast tissue (793.82) (R92.30)  Disc degeneration, lumbar (722.52) (M51.36)  Encounter for immunization (V03.89) (Z23)  Encounter for screening laboratory testing for COVID-19 virus (V01.79) (Z11.52)  Encounter for vitamin deficiency screening (V77.99) (Z13.21)  Fibromyalgia (729.1) (M79.7)  Hot flashes, menopausal (627.2) (N95.1)  Inflammatory arthritis (714.9) (M19.90)  IUD check up (V25.42) (Z30.431)  Lateral epicondylitis of right elbow (726.32) (M77.11)  Light cigarette smoker (305.1) (F17.210)  Long-term use of adalimumab (V58.69) (Z79.620)  Lumbar spondylosis (721.3) (M47.816)  Lumbosacral radiculopathy (724.4) (M54.17)  Menometrorrhagia (626.2) (N92.1)  Mild intermittent asthma with acute exacerbation (493.92) (J45.21)  Mild intermittent asthma without complication (493.90) (J45.20)  Neck pain (723.1) (M54.2)  Nonimmune to hepatitis B virus (V49.89) (Z78.9)  On long term leflunomide therapy (V58.69) (Z79.899)  Perimenopausal (627.2) (N95.1)  Polyarthritis (716.50) (M13.0)  Pre-operative clearance (V72.84) (Z01.818)  Raynaud's syndrome without gangrene (443.0) (I73.00)  Rheumatoid arthritis involving multiple sites with positive rheumatoid factor (714.0)  (M05.79)  Right shoulder pain (719.41) (M25.511)  S/P dilatation and curettage (V45.89) (Z98.890)  Screen for STD (sexually transmitted disease) (V74.5) (Z11.3)  Screening for HIV (human immunodeficiency virus) (V73.89) (Z11.4)  Screening for hyperlipidemia (V77.91) (Z13.220)  Screening for hypothyroidism (V77.0) (Z13.29)  Screening for STD (sexually transmitted disease) (V74.5) (Z11.3)  Simple ovarian cyst (620.2) (N83.209)  Spinal instability, lumbar (724.9) (M53.2X6)  Strain of shoulder, left (840.9) (S46.912A)  Subacromial bursitis of right shoulder joint (726.19) (M75.51)  Visit for screening mammogram (V76.12) (Z12.31)  Well woman exam with routine gynecological exam (V72.31) (Z01.419)       ?  Past Medical History  History of Abnormal urinalysis (791.9) (R82.90)  History of Acute otitis media, left (382.9) (H66.92)  Acute sinusitis (461.9) (J01.90)  Acute URI (465.9) (J06.9)  History of Cervical cancer screening (V76.2) (Z12.4)  History of COVID-19 virus infection (079.89) (U07.1)  History of Generalized Anxiety Disorder (V11.2)  History of Hand pain (729.5) (M79.643)  History of abnormal uterine bleeding (V13.29) (Z87.42)  History of attention deficit hyperactivity disorder (ADHD) (V11.8) (Z86.59)  History of back pain (V13.59) (Z87.39)  History of cough  History of depression (V11.8) (Z86.59)  History of gastric ulcer (V12.79) (Z87.11)  History of gastritis (V12.79) (Z87.19)  History of irregular menstrual cycles (V13.29) (Z87.42)  History of leukocytosis (V12.3) (Z86.2)  History of low back pain (V13.59) (Z87.39)  History of recent dental procedure (V15.29) (Z98.890)  History of rheumatoid arthritis (V13.4) (Z87.39)  History of sore throat (V12.69) (Z87.09)  History of vaginal discharge (V13.29) (Z87.42)  History of Last menstrual period (LMP) > 10 days ago (V49.89) (Z78.9)  Personal history of asthma (V12.69) (Z87.09)  Pharyngitis due to Streptococcus pyogenes (034.0) (J02.0)  History of Reflux esophagitis (530.11) (K21.00)  History of Screening for diabetes mellitus (DM) (V77.1) (Z13.1)  History of Serum albumin decreased (273.8) (E88.09)  History of Vaginal dryness (625.8) (N89.8)       Surgical History  History of  Section  History of  Section       ?  Family History  Family history of diabetes mellitus (V18.0) (Z83.3) : Father  Family history of hypertension (V17.49) (Z82.49) : Father  Family history of lung cancer (V16.1) (Z80.1) : Mother  Family history of malignant neoplasm of breast (V16.3) (Z80.3) : Mother       Social History  Born in [place]  Completed high school  History of Current some day smoker (305.1) (F17.200)  Currently sexually active   (V61.03) (Z63.5)  Does not use illicit drugs (V49.89) (Z78.9)  Exercises occasionally (V49.89) (Z78.9)  Has 2 children  Light cigarette smoker (305.1) (F17.210)  No alcohol use  No illicit drug use  No pets in home  No recent domestic travel  No recent foreign travel  Non-smoker (V49.89) (Z78.9)  Occupation  Physically inactive (V69.0) (Z72.3)  Social alcohol use (V49.89) (Z78.9)  History of Tobacco use (305.1) (Z72.0)       ?  Current Meds  Albuterol Sulfate (2.5 MG/3ML) 0.083% Inhalation Nebulization Solution; USE 1 UNIT  DOSE EVERY 4-6 HOURS AS NEEDED FOR WHEEZING  Albuterol Sulfate  (90 Base) MCG/ACT Inhalation Aerosol Solution; INHALE 1  PUFF BY MOUTH EVERY 4 HOURS AS NEEDED  ALPRAZolam 0.5 MG Oral Tablet; TAKE 0.5 TABLET Twice daily MDD:2 tabs  Amphetamine-Dextroamphetamine 30 MG Oral Tablet; TAKE 1 TABLET DAILY AS  DIRECTED. MDD:1 tab  Celecoxib 200 MG Oral Capsule; TAKE 1 CAPSULE TWICE DAILY WITH FOOD  Cyclobenzaprine HCl - 5 MG Oral Tablet; TAKE 1 TABLET AT BEDTIME AS NEEDED  Estradiol 0.05 MG/24HR Transdermal Patch Weekly; APPLY 1 PATCH WEEKLY AS  DIRECTED  Estradiol 0.5 MG/0.5GM Transdermal Gel; apply one packet to each thigh daily  Fluticasone Propionate 50 MCG/ACT Nasal Suspension; SPRAY 1 SPRAY INTO EACH  NOSTRIL TWICE A DAY  Gabapentin 100 MG Oral Capsule; 1 CAPSULE BY MOUTH 3 TIMES A DAY AS NEEDED  FOR PAIN  Gabapentin 100 MG Oral Capsule; take one tablet twice a day with food  Gabapentin 100 MG Oral Capsule; take two caps twice a day for 30 days  Humira Pen 40 MG/0.4ML Subcutaneous Pen-injector Kit; INJECT 40 MG  SUBCUTANEOUSLY ONCE EVERY 2 WEEKS  Leflunomide 20 MG Oral Tablet; TAKE 1 TABLET BY MOUTH EVERY DAY  Pantoprazole Sodium 40 MG Oral Tablet Delayed Release; TAKE 1 TABLET DAILY  RA Calcium 500 MG Oral Tablet; on P.o. t.i.d.At end of meals  Sharps Container; For disposal of Humira injections       ?  Allergies  No Known Drug Allergies       ?  Review of Systems          Hand Dominance: right.    Musculoskeletal: no arthralgia, no joint pain, no joint stiffness and no joint swelling . bilateral wrist pain with numbness in all fingers; pain right elbow.    Constitutional: no fever, no chills, no fatigue and no recent weight gain.    Eyes: no vision problems.    ENT: no decrease in hearing, no nasal discharge, no nosebleeds and no sore throat.    Cardiovascular: no chest pain, no palpitations, not tachycardic and no lower extremity edema.    Respiratory: no dyspnea, no cough, no shortness of breath during exertion and no wheezing.    Gastrointestinal: no abdominal pain, no constipation, no heartburn and no reflux.    Genitourinary: no dysuria and no incontinence . LMP 3 ya.    Integumentary: no skin lesions.    Neurological: no headache, no dizziness, no fainting and no convulsions.    Psychiatric: anxiety, but no depression and not suicidal. no sleep disturbances    Endocrine: no feeling weak, no muscle weakness, no hot flashes and no deepening of the voice.    Hematologic/Lymphatic: no tendency for easy bleeding, no tendency for easy bruising and no swollen glands. no cervical adenopathy    Allergic/Immunologic review of systems are otherwise negative except as noted in HPI.  ?  Assessment  ADD (attention deficit disorder) (314.00) (F98.8)  Anxiety (300.00) (F41.9)  Bilateral carpal tunnel syndrome (354.0) (G56.03)  Carpal tunnel syndrome of left wrist (354.0) (G56.02)  Carpal tunnel syndrome of right wrist (354.0) (G56.01)  Cervical radiculopathy (723.4) (M54.12)  Lateral epicondylitis of right elbow (726.32) (M77.11)  On long term leflunomide therapy (V58.69) (Z79.899)  History of  Section  History of  Section  Family history of diabetes mellitus (V18.0) (Z83.3) : Father  Family history of hypertension (V17.49) (Z82.49) : Father  Family history of lung cancer (V16.1) (Z80.1) : Mother  No illicit drug use  Endoscopic bilateral carpal tunnel release is planned for 2024  Patient will have PE on admission  Pre op instructions were reviewed including medication instructions and chlorhexadine soap  The patient does not require medical clearance or urine pregnancy testing on admission  Best wishes offered.          height: 5'5  Weight: 173  BP: 129/86  Resp: 13  HR: 73  O2Sat: 98%  T: 97.8     normocephalic  normal eyes, ears, mouth, dentition  no lymphadenopathy  full neck ROM  lungs CTA  heart sounds normal  +BS, no guarding  no peripheral edema, joint swelling, arthropathy  spine normal with full ROM  bilateral wrist pain with numbness in all fingers  arsalan and affect appropriate    NPO since   patient transferred to OR after asdfg  best wishes offered       ?

## 2024-05-20 NOTE — REVIEW OF SYSTEMS
[Right] : right [Anxiety] : anxiety [Negative] : Allergic/Immunologic [Arthralgia] : no arthralgia [Joint Pain] : no joint pain [Joint Stiffness] : no joint stiffness [Joint Swelling] : no joint swelling [Fever] : no fever [Chills] : no chills [Fatigue] : no fatigue [Recent Weight Gain (___ Lbs)] : no recent ~M [unfilled] weight gain [Vision Problems] : no vision problems [Decrease Hearing] : no decrease in hearing [Nasal Discharge] : no nasal discharge [Nosebleeds] : no nosebleeds [Sore Throat] : no sore throat [Chest Pain] : no chest pain [Palpitations] : no palpitations [Tachycardic] : not tachycardic [Lower Ext Edema] : no lower extremity edema [Dyspnea] : no dyspnea [Cough] : no cough [SOB on Exertion] : no shortness of breath during exertion [Wheezing] : no wheezing [Abdominal Pain] : no abdominal pain [Constipation] : no constipation [Heartburn] : no heartburn [Reflux] : no reflux [Dysuria] : no dysuria [Incontinence] : no incontinence [Skin Lesions] : no skin lesions [Headache] : no headache [Dizziness] : no dizziness [Fainting] : no fainting [Convulsions] : no convulsions [Depression] : no depression [Suicidal] : not suicidal [Sleep Disturbances] : ~T no sleep disturbances [Feeling Weak] : no feeling weak [Muscle Weakness] : no muscle weakness [Hot Flashes] : no hot flashes [Deepening Of The Voice] : no deepening of the voice [Easy Bleeding] : no tendency for easy bleeding [Easy Bruising] : no tendency for easy bruising [Swollen Glands] : no swollen glands [Cervical Lymph Nodes Enlarged] : no cervical adenopathy [FreeTextEntry9] : bilateral wrist pain with numbness in all fingers; pain right elbow [FreeTextEntry8] : LMP 3 ya

## 2024-05-20 NOTE — HISTORY OF PRESENT ILLNESS
[FreeTextEntry1] : 47 yo female reports 2 year history of bilateral wrist pain with numbness and tingling in all fingers.

## 2024-05-20 NOTE — PRE-SURGICAL ASSESSMENT
[1 week] : 1 week [none] : none [0 - 2: Low Risk] : 0 - 2: Low Risk [No] : No, patient is not pregnant [S: Do you SNORE loudly] : does not snore loudly [T: Are you TIRED, fatigued, or sleepy during the daytime] : not tired, fatigued, or sleepy during the daytime [O: Has anyone OBSERVED you stop breathing during your sleep] : not observed to have stopped during sleep [P: Do you have, or are you being treated for, high blood PRESSURE] : no hypertension, not treated for high blood pressure [B: BMI greater than 35 kG/m2] : BMI less than 35 kG/m2 [A: AGE over 50 years old] : not over 50 years of age [N: NECK circumference greater than 16 inches] : neck circumference less than 16 inches [G: GENDER (birth sex) = Male] : not male (birth sex) [Devices being used in treatment (i.e., pacemaker, defib, loop recorder, stimulator, pain pump, other devices/implantables)] : No devices in use in treatment [Advance Directive in Chart] : No advance directive in chart [Health Care Proxy information in chart] : No Health Care Proxy information in chart

## 2024-05-20 NOTE — ASSESSMENT
[FreeTextEntry1] : Endoscopic bilateral carpal tunnel release is planned for 5/21/2024 Patient will have PE on admission Pre op instructions were reviewed including medication instructions and chlorhexadine soap The patient does not require medical clearance or urine pregnancy testing on admission  Best wishes offered

## 2024-05-21 ENCOUNTER — OUTPATIENT (OUTPATIENT)
Dept: OUTPATIENT SERVICES | Facility: HOSPITAL | Age: 49
LOS: 1 days | End: 2024-05-21
Payer: COMMERCIAL

## 2024-05-21 ENCOUNTER — TRANSCRIPTION ENCOUNTER (OUTPATIENT)
Age: 49
End: 2024-05-21

## 2024-05-21 ENCOUNTER — APPOINTMENT (OUTPATIENT)
Dept: RHEUMATOLOGY | Facility: CLINIC | Age: 49
End: 2024-05-21

## 2024-05-21 ENCOUNTER — APPOINTMENT (OUTPATIENT)
Dept: ORTHOPEDIC SURGERY | Facility: HOSPITAL | Age: 49
End: 2024-05-21

## 2024-05-21 VITALS
DIASTOLIC BLOOD PRESSURE: 74 MMHG | OXYGEN SATURATION: 99 % | SYSTOLIC BLOOD PRESSURE: 154 MMHG | HEART RATE: 78 BPM | TEMPERATURE: 98 F | RESPIRATION RATE: 16 BRPM

## 2024-05-21 VITALS
OXYGEN SATURATION: 98 % | WEIGHT: 175.71 LBS | HEART RATE: 84 BPM | RESPIRATION RATE: 15 BRPM | TEMPERATURE: 97 F | SYSTOLIC BLOOD PRESSURE: 140 MMHG | DIASTOLIC BLOOD PRESSURE: 95 MMHG | HEIGHT: 65 IN

## 2024-05-21 DIAGNOSIS — Z98.891 HISTORY OF UTERINE SCAR FROM PREVIOUS SURGERY: Chronic | ICD-10-CM

## 2024-05-21 DIAGNOSIS — G56.01 CARPAL TUNNEL SYNDROME, RIGHT UPPER LIMB: ICD-10-CM

## 2024-05-21 DIAGNOSIS — G56.02 CARPAL TUNNEL SYNDROME, LEFT UPPER LIMB: ICD-10-CM

## 2024-05-21 LAB — HCG SERPL-ACNC: 2 MIU/ML — SIGNIFICANT CHANGE UP

## 2024-05-21 PROCEDURE — 84702 CHORIONIC GONADOTROPIN TEST: CPT

## 2024-05-21 PROCEDURE — 29848 WRIST ENDOSCOPY/SURGERY: CPT | Mod: 50

## 2024-05-21 PROCEDURE — 36415 COLL VENOUS BLD VENIPUNCTURE: CPT

## 2024-05-21 RX ORDER — HYDROMORPHONE HYDROCHLORIDE 2 MG/ML
0.5 INJECTION INTRAMUSCULAR; INTRAVENOUS; SUBCUTANEOUS
Refills: 0 | Status: DISCONTINUED | OUTPATIENT
Start: 2024-05-21 | End: 2024-05-21

## 2024-05-21 RX ORDER — APREPITANT 80 MG/1
40 CAPSULE ORAL ONCE
Refills: 0 | Status: COMPLETED | OUTPATIENT
Start: 2024-05-21 | End: 2024-05-21

## 2024-05-21 RX ORDER — IBUPROFEN 200 MG
1 TABLET ORAL
Qty: 10 | Refills: 0
Start: 2024-05-21

## 2024-05-21 RX ORDER — CHLORHEXIDINE GLUCONATE 213 G/1000ML
1 SOLUTION TOPICAL ONCE
Refills: 0 | Status: COMPLETED | OUTPATIENT
Start: 2024-05-21 | End: 2024-05-21

## 2024-05-21 RX ORDER — CEFAZOLIN SODIUM 1 G
2000 VIAL (EA) INJECTION ONCE
Refills: 0 | Status: COMPLETED | OUTPATIENT
Start: 2024-05-21 | End: 2024-05-21

## 2024-05-21 RX ORDER — HYDROMORPHONE HYDROCHLORIDE 2 MG/ML
1 INJECTION INTRAMUSCULAR; INTRAVENOUS; SUBCUTANEOUS
Refills: 0 | Status: DISCONTINUED | OUTPATIENT
Start: 2024-05-21 | End: 2024-05-21

## 2024-05-21 RX ORDER — OXYCODONE AND ACETAMINOPHEN 5; 325 MG/1; MG/1
1 TABLET ORAL
Qty: 3 | Refills: 0
Start: 2024-05-21

## 2024-05-21 RX ORDER — OXYCODONE HYDROCHLORIDE 5 MG/1
5 TABLET ORAL ONCE
Refills: 0 | Status: DISCONTINUED | OUTPATIENT
Start: 2024-05-21 | End: 2024-05-21

## 2024-05-21 RX ORDER — MULTIVIT-MIN/FERROUS GLUCONATE 9 MG/15 ML
1 LIQUID (ML) ORAL
Qty: 0 | Refills: 0 | DISCHARGE

## 2024-05-21 RX ORDER — ALBUTEROL 90 UG/1
2 AEROSOL, METERED ORAL
Qty: 0 | Refills: 0 | DISCHARGE

## 2024-05-21 RX ORDER — VENLAFAXINE HCL 75 MG
1 CAPSULE, EXT RELEASE 24 HR ORAL
Qty: 0 | Refills: 0 | DISCHARGE

## 2024-05-21 RX ORDER — SODIUM CHLORIDE 9 MG/ML
1000 INJECTION, SOLUTION INTRAVENOUS
Refills: 0 | Status: DISCONTINUED | OUTPATIENT
Start: 2024-05-21 | End: 2024-05-21

## 2024-05-21 RX ADMIN — CHLORHEXIDINE GLUCONATE 1 APPLICATION(S): 213 SOLUTION TOPICAL at 07:49

## 2024-05-21 RX ADMIN — SODIUM CHLORIDE 100 MILLILITER(S): 9 INJECTION, SOLUTION INTRAVENOUS at 11:22

## 2024-05-21 RX ADMIN — HYDROMORPHONE HYDROCHLORIDE 0.5 MILLIGRAM(S): 2 INJECTION INTRAMUSCULAR; INTRAVENOUS; SUBCUTANEOUS at 11:36

## 2024-05-21 RX ADMIN — APREPITANT 40 MILLIGRAM(S): 80 CAPSULE ORAL at 08:21

## 2024-05-21 NOTE — ASU DISCHARGE PLAN (ADULT/PEDIATRIC) - ASU DC SPECIAL INSTRUCTIONSFT
DO NOT wet or remove the dressing.  Elevate the extremity to reduce swelling.  No strenuous activities or heavy lifting.    Please follow Dr. Garcia's instructions.    Follow up in the office on 5/24/24.  Please call to confirm the appointment.

## 2024-05-21 NOTE — ASU DISCHARGE PLAN (ADULT/PEDIATRIC) - CARE PROVIDER_API CALL
Venu Garcia)  [facility]  [address]  [place] [address]  Phone: [phone]  Fax: [phone]  Scheduled Appointment: 05/24/2024

## 2024-05-24 ENCOUNTER — APPOINTMENT (OUTPATIENT)
Dept: ORTHOPEDIC SURGERY | Facility: CLINIC | Age: 49
End: 2024-05-24
Payer: COMMERCIAL

## 2024-05-24 PROCEDURE — 99024 POSTOP FOLLOW-UP VISIT: CPT

## 2024-05-24 NOTE — END OF VISIT
[FreeTextEntry3] : This note was written by Taj Gibson on 05/24/2024 acting solely as a scribe for Dr. Venu Garcia.   All medical record entries made by the Scribe were at my, Dr. Venu Garcia, direction and personally dictated by me on 05/24/2024. I have personally reviewed the chart and agree that the record accurately reflects my personal performance of the history, physical exam, assessment and plan.

## 2024-05-24 NOTE — RETURN TO WORK/SCHOOL
[FreeTextEntry1] : Patient was seen and examined today. She may return to work Tuesday May 28, 2024.

## 2024-05-24 NOTE — ADDENDUM
[FreeTextEntry1] :  I, Taj Gibson, acted solely as a scribe for Dr. Garcia on this date on 05/24/2024.

## 2024-05-24 NOTE — HISTORY OF PRESENT ILLNESS
[de-identified] : 3 days postoperative. [de-identified] : 3 days status post endoscopic bilateral carpal tunnel releases.  Date of surgery: 5/21/2024.  She is overall doing well today. She denies any numbness or tingling.  [de-identified] : Examination both wrist and hands after the dressings were removed demonstrates her incisions to be clean and dry.  There is no drainage or evidence of infection.  There is some swelling and ecchymosis.  She has full flexion and extension of the digits.  She has intact sensation to light touch distally along the radial, ulnar and median nerve distributions. [de-identified] : Stable, 3 days postoperative. [de-identified] : The suture ends were cut and Steri-Strips were applied.  She was instructed on local wound care, when to begin scar massage and desensitization, range of motion exercises and will followup in 3-4 weeks.  The patient was instructed to return before then or to call the office if there are any problems in the interim.

## 2024-05-31 ENCOUNTER — NON-APPOINTMENT (OUTPATIENT)
Age: 49
End: 2024-05-31

## 2024-06-03 ENCOUNTER — APPOINTMENT (OUTPATIENT)
Dept: OBGYN | Facility: CLINIC | Age: 49
End: 2024-06-03
Payer: COMMERCIAL

## 2024-06-03 VITALS
SYSTOLIC BLOOD PRESSURE: 124 MMHG | DIASTOLIC BLOOD PRESSURE: 72 MMHG | WEIGHT: 175 LBS | HEIGHT: 64 IN | BODY MASS INDEX: 29.88 KG/M2

## 2024-06-03 DIAGNOSIS — Z11.3 ENCOUNTER FOR SCREENING FOR INFECTIONS WITH A PREDOMINANTLY SEXUAL MODE OF TRANSMISSION: ICD-10-CM

## 2024-06-03 DIAGNOSIS — N95.1 MENOPAUSAL AND FEMALE CLIMACTERIC STATES: ICD-10-CM

## 2024-06-03 DIAGNOSIS — Z79.890 HORMONE REPLACEMENT THERAPY: ICD-10-CM

## 2024-06-03 PROCEDURE — 99213 OFFICE O/P EST LOW 20 MIN: CPT

## 2024-06-03 RX ORDER — ESTRADIOL 0.05 MG/D
0.05 PATCH, EXTENDED RELEASE TRANSDERMAL
Qty: 18 | Refills: 3 | Status: ACTIVE | COMMUNITY
Start: 2024-06-03 | End: 1900-01-01

## 2024-06-03 NOTE — HISTORY OF PRESENT ILLNESS
[FreeTextEntry1] : Soha presents for follow up of HRT, was started on estradiol patch at last visit. Reports significant improvement in hot flashes since starting estradiol.  She has an IUD for endometrial protection.  She states that the patch falls off easily.  She states that weight gain has been a problem since perimenopause, and is seeing dr robles for management.   [No] : Patient does not have concerns regarding sex [Patient would like to be screened for STIs] : Patient would like to be screened for STIs

## 2024-06-03 NOTE — DISCUSSION/SUMMARY
[FreeTextEntry1] : She is doing well on estradiol patch for control of hot flashes. I advised that she increase to twice weekly as they have been falling off early. New rx sent. Cont IUD for endometrial protection.  She has a new partner from only a few days ago. I advised that STD testing can be performed but would need to be repeated in 8-12 weeks, she states she would rather return a few weeks for testing. GC/CT ordered off urine.
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
## 2024-06-10 ENCOUNTER — APPOINTMENT (OUTPATIENT)
Dept: FAMILY MEDICINE | Facility: CLINIC | Age: 49
End: 2024-06-10

## 2024-06-11 ENCOUNTER — NON-APPOINTMENT (OUTPATIENT)
Age: 49
End: 2024-06-11

## 2024-06-19 ENCOUNTER — APPOINTMENT (OUTPATIENT)
Dept: ORTHOPEDIC SURGERY | Facility: CLINIC | Age: 49
End: 2024-06-19
Payer: COMMERCIAL

## 2024-06-19 DIAGNOSIS — G56.02 CARPAL TUNNEL SYNDROME, LEFT UPPER LIMB: ICD-10-CM

## 2024-06-19 DIAGNOSIS — G56.01 CARPAL TUNNEL SYNDROME, RIGHT UPPER LIMB: ICD-10-CM

## 2024-06-19 PROCEDURE — 99024 POSTOP FOLLOW-UP VISIT: CPT

## 2024-06-19 NOTE — HISTORY OF PRESENT ILLNESS
[de-identified] : 29 days postoperative. [de-identified] : 29 days status post endoscopic bilateral carpal tunnel releases.  Date of surgery: 5/21/2024.  She is still having pain, along with burning sensation. She rates her pain as a 10/10.  She denies any numbness and tingling. She states that her symptoms are worse at night.   [de-identified] : Examination both wrist and hands demonstrates her incisions to be healing well.  There is decreased swelling.  However, she does have tenderness in both palms and along the carpal tunnels.  Provocative signs for carpal tunnel syndrome are negative.  She has full flexion and extension of the digits.  She has intact sensation to light touch distally along the radial, ulnar and median nerve distributions. [de-identified] : 29 days postoperative, with hypersensitivity along the incisions and the carpal tunnels within the palms.  She no longer has numbness or tingling. [de-identified] : She was instructed on continued range of motion exercises and scar massage and desensitization.  She deferred a referral to hand therapy.  She was prescribed a course of a Medrol dosepak. She was instructed to discontinue taking Gabapentin.  I also recommended for her to wear bilateral carpal tunnel splints at night.  She will follow-up in 2 weeks.  If her symptoms change or worsen before then, then she was instructed to call the office.

## 2024-06-19 NOTE — END OF VISIT
[FreeTextEntry3] : This note was written by Taj Gibson on 06/19/2024 acting solely as a scribe for Dr. Venu Garcia.   All medical record entries made by the Scribe were at my, Dr. Venu Garcia, direction and personally dictated by me on 06/19/2024. I have personally reviewed the chart and agree that the record accurately reflects my personal performance of the history, physical exam, assessment and plan.

## 2024-06-19 NOTE — ADDENDUM
[FreeTextEntry1] :  I, Taj Gibson, acted solely as a scribe for Dr. Garcia on this date on 06/19/2024.

## 2024-06-22 RX ORDER — ALPRAZOLAM 0.5 MG/1
0.5 TABLET ORAL TWICE DAILY
Qty: 30 | Refills: 0 | Status: ACTIVE | COMMUNITY
Start: 2023-01-26 | End: 1900-01-01

## 2024-06-22 RX ORDER — DEXTROAMPHETAMINE SACCHARATE, AMPHETAMINE ASPARTATE, DEXTROAMPHETAMINE SULFATE AND AMPHETAMINE SULFATE 7.5; 7.5; 7.5; 7.5 MG/1; MG/1; MG/1; MG/1
30 TABLET ORAL
Qty: 30 | Refills: 0 | Status: ACTIVE | COMMUNITY
Start: 2021-06-10 | End: 1900-01-01

## 2024-06-26 ENCOUNTER — APPOINTMENT (OUTPATIENT)
Dept: FAMILY MEDICINE | Facility: CLINIC | Age: 49
End: 2024-06-26
Payer: COMMERCIAL

## 2024-06-26 VITALS
DIASTOLIC BLOOD PRESSURE: 80 MMHG | SYSTOLIC BLOOD PRESSURE: 124 MMHG | OXYGEN SATURATION: 97 % | HEART RATE: 95 BPM | TEMPERATURE: 97 F | BODY MASS INDEX: 30.73 KG/M2 | HEIGHT: 64 IN | WEIGHT: 180 LBS

## 2024-06-26 DIAGNOSIS — E66.9 OBESITY, UNSPECIFIED: ICD-10-CM

## 2024-06-26 DIAGNOSIS — M79.7 FIBROMYALGIA: ICD-10-CM

## 2024-06-26 DIAGNOSIS — F98.8 OTHER SPECIFIED BEHAVIORAL AND EMOTIONAL DISORDERS WITH ONSET USUALLY OCCURRING IN CHILDHOOD AND ADOLESCENCE: ICD-10-CM

## 2024-06-26 PROCEDURE — 99214 OFFICE O/P EST MOD 30 MIN: CPT

## 2024-06-26 RX ORDER — MELOXICAM 15 MG/1
15 TABLET ORAL DAILY
Qty: 30 | Refills: 1 | Status: ACTIVE | COMMUNITY
Start: 2024-06-26 | End: 1900-01-01

## 2024-06-26 RX ORDER — METHYLPREDNISOLONE 4 MG/1
4 TABLET ORAL
Qty: 21 | Refills: 0 | Status: DISCONTINUED | COMMUNITY
Start: 2024-06-19 | End: 2024-06-26

## 2024-07-08 NOTE — ASU PREOP CHECKLIST - WARM FLUIDS/WARM BLANKETS
[facility] GERIATRICS  ACUTE/EPISODIC VISIT    [facility] Medical Record Number:  [id number]  Place of Service where encounter took place:  [facility] [[id number]]    Chief Complaint   Patient presents with    RECHECK       HPI:    Kylah Britt is a 73 year old  (1950), who is being seen today for an episodic care visit.  HPI information obtained from: facility chart records, facility staff, patient report, and [facility] chart review.    Today's concern is:  Diagnoses         Codes Comments    Psychomotor restlessness    -  Primary R45.1     Hair pulling     F63.3     Mixed anxiety and depressive disorder     F41.8     Neurodegenerative disorder (H24)     G31.9     Unsteady gait     R26.81           Came to see Anamaria today in follow-up how she is doing behavior wise since adding the Seroquel 12.5 mg twice a day and tapering her down off of the BuSpar.  It has been a couple weeks since this has occurred.  From Anamaria in her recliner in her apartment.  She was adjusting her hair which she has a tendency to pull on but did feel that it seemed to be a little more less and her hair was not tousled  all over.  She just has one area that she always just rearranges the strands of her hair.  Anamaria herself stated that she feels that she has not been yelling at the staff lately.  Did ask the [facility] how things were going for Anamaria.  She reminded this nurse practitioner that tomorrow is a family care conference and is willing to call this nurse practitioner to be on via the phone versus in person as planned.  The [facility] has noticed that staff have been using a gait belt and ambulating with her down the hallway to the dining room.  Feel that this is going to be addressed tomorrow during the care conference.  There is a possibility of her fitting in better down in the memory care unit where she could have more interaction with other residents as well as having more help from  staff.  Currently she lives at the very end of the hallway and with her mobility changing, she probably would need to have a change in the apartment location.    ALLERGIES:    Allergies   Allergen Reactions    No Known Drug Allergy         MEDICATIONS:  Post Discharge Medication Reconciliation Status: patient was not discharged from an inpatient facility or TCU.     Current Outpatient Medications   Medication Sig Dispense Refill    acetaminophen (TYLENOL) 500 MG tablet Take 1,000 mg by mouth 3 times daily And 1000mg daily PRN      baclofen (LIORESAL) 10 MG tablet Take 10 mg by mouth 2 times daily 3pm and 9pm, takes 5mg every morning      busPIRone (BUSPAR) 5 MG tablet 5mg po 3x a day for 10 days, then 5mg po twice a day for 10 days then 5mg po daily x4 days then discontinue. 54 tablet 0    Calcium Carbonate (CALCIUM 600 PO) Take 1 tablet by mouth every other day      diclofenac (VOLTAREN) 1 % topical gel Apply 4 g topically 4 times daily Apply to knees      FLUoxetine (PROZAC) 10 MG capsule Take 2 capsules (20 mg) by mouth daily      gabapentin (NEURONTIN) 100 MG capsule 100mg po in AM and at 12N, 200mg po at bedtime and 100mg po every 4 hours as needed for RLS or pain 120 capsule 4    Lidocaine (LIDOCARE) 4 % Patch Place 1 patch onto the skin every 24 hours To prevent lidocaine toxicity, patient should be patch free for 12 hrs daily. Apply to L hip      LORazepam (ATIVAN) 0.5 MG tablet Take 1 tablet (0.5 mg) by mouth every 6 hours as needed for anxiety or agitation 10 tablet 5    melatonin 3 MG tablet Take 3 tablets (9 mg) by mouth At Bedtime      QUEtiapine (SEROQUEL) 25 MG tablet 12.5mg by mouth at bedtime x3 nights then increase to 12.5mg twice a day 30 tablet 5    SENNA-docusate sodium (SENNA S) 8.6-50 MG tablet Take 1 tablet by mouth 2 times daily      traMADol (ULTRAM) 50 MG tablet Take 0.5 tablets (25 mg) by mouth 2 times daily 30 tablet 0    traZODone (DESYREL) 50 MG tablet Take 1 tablet (50 mg) by mouth At  Bedtime 31 tablet 11    vitamin D3 (CHOLECALCIFEROL) 50 mcg (2000 units) tablet Take 50 mcg by mouth every other day         REVIEW OF SYSTEMS:  4 point ROS neg other than the symptoms noted above in the HPI.  Denied any chest pain, no shortness of breath, bowels have been moving well.      PHYSICAL EXAM:  /74   Pulse 69   Temp 97.3  F (36.3  C)   Resp 16   Wt 46.6 kg (102 lb 12.8 oz)   BMI 18.80 kg/m    Petite female sitting in her recliner, easily follows directions when asked.  Felt that she seemed to be a little bit more focused with the conversation today.  Continues to rearrange her hair but did not feel that it was actually pulling off her hair today.  Regardless her hair looked neatly groomed.  Skin is pink, dry, warm to touch  Heart rate is regular and strong with S1 and S2 heard.  No edema in her lower extremities.  Has not nice pink leather leg brace for her right lower extremity that is used when she ambulates with her four-wheel walker  Lung sounds are clear with good expansion  Abdomen is flat soft and nontender to touch.  Bowel sounds are present but quiet    No recent labs done except for checking for UTI in early June.      ASSESSMENT / PLAN:  (R45.1) Psychomotor restlessness  (primary encounter diagnosis)  (F63.3) Hair pulling  Comment: Kylah was tapered up to 12.5 mg of Seroquel twice a day.  Do feel that she has tolerated this medication and seems to be less restless.  The time this NP just sees her today is only a snapshot of what she is like day-to-day.  Will not be increasing the dose of the Seroquel today.  Will talk about it tomorrow during the family care conference.    (F41.8) Mixed anxiety and depressive disorder  Comment: Tapering Anamaria off of the BuSpar which should be done in the next week or so.  Remains on Prozac 20 mg daily and also has as needed lorazepam for times that staff are not able to redirect her when she becomes anxious.  Anamaria was given the lorazepam 4 times  in the month of June.    (G31.9) Neurodegenerative disorder (H24)  (R26.81) Unsteady gait  Comment: According to the [facility], Anamaria has been using her call light more and staff using the transfer belt to assist her with ambulating.  Last fall was documented 6/25/2024.  And this nurse practitioner's opinion, feel the Seroquel has slowed her slightly but in turn our staff trying to be proactive and helping her more with mobility with the transfer belt so she is not falling.  Anamaria does enjoy attending activities and goes out to the dining room for meals.  Will remain on baclofen to help with muscle relaxation    Orders:   No new orders    Electronically signed by  ESPERANZA Fuetnes CNP          no

## 2024-08-19 ENCOUNTER — RX RENEWAL (OUTPATIENT)
Age: 49
End: 2024-08-19

## 2024-09-18 ENCOUNTER — RX RENEWAL (OUTPATIENT)
Age: 49
End: 2024-09-18

## 2024-09-19 ENCOUNTER — APPOINTMENT (OUTPATIENT)
Dept: FAMILY MEDICINE | Facility: CLINIC | Age: 49
End: 2024-09-19

## 2024-09-24 ENCOUNTER — NON-APPOINTMENT (OUTPATIENT)
Age: 49
End: 2024-09-24

## 2024-09-24 NOTE — HISTORY OF PRESENT ILLNESS
[de-identified] : 3-1/2 months postoperative. [de-identified] : 3-1/2 months status post endoscopic bilateral carpal tunnel releases.  Date of surgery: 5/21/2024.  She returns today, [de-identified] : Examination both wrist and hands demonstrates her incisions to be well-healed.  There is no residual swelling.  However, she does have tenderness in both palms and along the carpal tunnels.  Provocative signs for carpal tunnel syndrome are negative.  She has full flexion and extension of the digits.  She has intact sensation to light touch distally along the radial, ulnar and median nerve distributions. [de-identified] : 3-1/2 months postoperative, [de-identified] : At this time, I recommended

## 2024-10-07 ENCOUNTER — APPOINTMENT (OUTPATIENT)
Dept: ORTHOPEDIC SURGERY | Facility: CLINIC | Age: 49
End: 2024-10-07

## 2024-10-07 DIAGNOSIS — G56.02 CARPAL TUNNEL SYNDROME, LEFT UPPER LIMB: ICD-10-CM

## 2024-10-07 DIAGNOSIS — G56.01 CARPAL TUNNEL SYNDROME, RIGHT UPPER LIMB: ICD-10-CM

## 2024-10-16 ENCOUNTER — APPOINTMENT (OUTPATIENT)
Dept: FAMILY MEDICINE | Facility: CLINIC | Age: 49
End: 2024-10-16
Payer: COMMERCIAL

## 2024-10-16 DIAGNOSIS — Z86.69 PERSONAL HISTORY OF OTHER DISEASES OF THE NERVOUS SYSTEM AND SENSE ORGANS: ICD-10-CM

## 2024-10-16 DIAGNOSIS — F41.9 ANXIETY DISORDER, UNSPECIFIED: ICD-10-CM

## 2024-10-16 DIAGNOSIS — F98.8 OTHER SPECIFIED BEHAVIORAL AND EMOTIONAL DISORDERS WITH ONSET USUALLY OCCURRING IN CHILDHOOD AND ADOLESCENCE: ICD-10-CM

## 2024-10-16 PROCEDURE — 99213 OFFICE O/P EST LOW 20 MIN: CPT

## 2024-11-20 ENCOUNTER — APPOINTMENT (OUTPATIENT)
Dept: OBGYN | Facility: CLINIC | Age: 49
End: 2024-11-20

## 2025-03-11 ENCOUNTER — APPOINTMENT (OUTPATIENT)
Dept: OBGYN | Facility: CLINIC | Age: 50
End: 2025-03-11

## 2025-04-01 ENCOUNTER — APPOINTMENT (OUTPATIENT)
Dept: RHEUMATOLOGY | Facility: CLINIC | Age: 50
End: 2025-04-01
Payer: COMMERCIAL

## 2025-04-01 VITALS
HEIGHT: 64 IN | SYSTOLIC BLOOD PRESSURE: 135 MMHG | OXYGEN SATURATION: 100 % | WEIGHT: 149 LBS | DIASTOLIC BLOOD PRESSURE: 90 MMHG | BODY MASS INDEX: 25.44 KG/M2 | HEART RATE: 93 BPM

## 2025-04-01 DIAGNOSIS — Z79.69 LONG TERM (CURRENT) USE OF OTHER IMMUNOMODULATORS AND IMMUNOSUPPRESSANTS: ICD-10-CM

## 2025-04-01 DIAGNOSIS — G56.03 CARPAL TUNNEL SYNDROM,BILATERAL UPPER LIMBS: ICD-10-CM

## 2025-04-01 PROCEDURE — G2211 COMPLEX E/M VISIT ADD ON: CPT | Mod: NC

## 2025-04-01 PROCEDURE — 99214 OFFICE O/P EST MOD 30 MIN: CPT

## 2025-04-01 PROCEDURE — 36415 COLL VENOUS BLD VENIPUNCTURE: CPT

## 2025-04-01 RX ORDER — METHOCARBAMOL 500 MG/1
500 TABLET, FILM COATED ORAL
Qty: 30 | Refills: 0 | Status: ACTIVE | COMMUNITY
Start: 2025-04-01 | End: 1900-01-01

## 2025-04-02 LAB
ALBUMIN SERPL ELPH-MCNC: 4.5 G/DL
ALP BLD-CCNC: 61 U/L
ALT SERPL-CCNC: 12 U/L
ANION GAP SERPL CALC-SCNC: 10 MMOL/L
AST SERPL-CCNC: 14 U/L
BASOPHILS # BLD AUTO: 0.12 K/UL
BASOPHILS NFR BLD AUTO: 1.6 %
BILIRUB SERPL-MCNC: 0.2 MG/DL
BUN SERPL-MCNC: 18 MG/DL
CALCIUM SERPL-MCNC: 9.6 MG/DL
CHLORIDE SERPL-SCNC: 106 MMOL/L
CO2 SERPL-SCNC: 23 MMOL/L
CREAT SERPL-MCNC: 0.65 MG/DL
CRP SERPL-MCNC: <3 MG/L
EGFRCR SERPLBLD CKD-EPI 2021: 107 ML/MIN/1.73M2
EOSINOPHIL # BLD AUTO: 0.4 K/UL
EOSINOPHIL NFR BLD AUTO: 5.3 %
ERYTHROCYTE [SEDIMENTATION RATE] IN BLOOD BY WESTERGREN METHOD: 13 MM/HR
GLUCOSE SERPL-MCNC: 113 MG/DL
HAV IGM SER QL: NONREACTIVE
HBV CORE IGG+IGM SER QL: NONREACTIVE
HBV CORE IGM SER QL: NONREACTIVE
HBV SURFACE AG SER QL: NONREACTIVE
HCT VFR BLD CALC: 43.4 %
HCV AB SER QL: NONREACTIVE
HCV S/CO RATIO: 0.13 S/CO
HGB BLD-MCNC: 14 G/DL
IMM GRANULOCYTES NFR BLD AUTO: 0.1 %
LYMPHOCYTES # BLD AUTO: 1.68 K/UL
LYMPHOCYTES NFR BLD AUTO: 22.4 %
MAN DIFF?: NORMAL
MCHC RBC-ENTMCNC: 30.4 PG
MCHC RBC-ENTMCNC: 32.3 G/DL
MCV RBC AUTO: 94.3 FL
MONOCYTES # BLD AUTO: 0.79 K/UL
MONOCYTES NFR BLD AUTO: 10.5 %
NEUTROPHILS # BLD AUTO: 4.5 K/UL
NEUTROPHILS NFR BLD AUTO: 60.1 %
PLATELET # BLD AUTO: 278 K/UL
POTASSIUM SERPL-SCNC: 4.8 MMOL/L
PROT SERPL-MCNC: 7 G/DL
RBC # BLD: 4.6 M/UL
RBC # FLD: 13 %
SODIUM SERPL-SCNC: 139 MMOL/L
WBC # FLD AUTO: 7.5 K/UL

## 2025-04-04 LAB
M TB IFN-G BLD-IMP: NEGATIVE
QUANTIFERON TB PLUS MITOGEN MINUS NIL: 3.73 IU/ML
QUANTIFERON TB PLUS NIL: 0.08 IU/ML
QUANTIFERON TB PLUS TB1 MINUS NIL: 0.09 IU/ML
QUANTIFERON TB PLUS TB2 MINUS NIL: 0.03 IU/ML

## 2025-04-07 ENCOUNTER — APPOINTMENT (OUTPATIENT)
Dept: FAMILY MEDICINE | Facility: CLINIC | Age: 50
End: 2025-04-07
Payer: COMMERCIAL

## 2025-04-07 VITALS
BODY MASS INDEX: 25.61 KG/M2 | SYSTOLIC BLOOD PRESSURE: 124 MMHG | WEIGHT: 150 LBS | OXYGEN SATURATION: 96 % | DIASTOLIC BLOOD PRESSURE: 80 MMHG | HEIGHT: 64 IN | HEART RATE: 99 BPM

## 2025-04-07 DIAGNOSIS — Z13.29 ENCOUNTER FOR SCREENING FOR OTHER SUSPECTED ENDOCRINE DISORDER: ICD-10-CM

## 2025-04-07 DIAGNOSIS — M19.90 UNSPECIFIED OSTEOARTHRITIS, UNSPECIFIED SITE: ICD-10-CM

## 2025-04-07 DIAGNOSIS — Z12.11 ENCOUNTER FOR SCREENING FOR MALIGNANT NEOPLASM OF COLON: ICD-10-CM

## 2025-04-07 DIAGNOSIS — L65.9 NONSCARRING HAIR LOSS, UNSPECIFIED: ICD-10-CM

## 2025-04-07 DIAGNOSIS — M79.7 FIBROMYALGIA: ICD-10-CM

## 2025-04-07 DIAGNOSIS — N92.1 EXCESSIVE AND FREQUENT MENSTRUATION WITH IRREGULAR CYCLE: ICD-10-CM

## 2025-04-07 DIAGNOSIS — N83.209 UNSPECIFIED OVARIAN CYST, UNSPECIFIED SIDE: ICD-10-CM

## 2025-04-07 DIAGNOSIS — F17.210 NICOTINE DEPENDENCE, CIGARETTES, UNCOMPLICATED: ICD-10-CM

## 2025-04-07 DIAGNOSIS — H10.10 ACUTE ATOPIC CONJUNCTIVITIS, UNSPECIFIED EYE: ICD-10-CM

## 2025-04-07 DIAGNOSIS — I73.00 RAYNAUD'S SYNDROME W/OUT GANGRENE: ICD-10-CM

## 2025-04-07 DIAGNOSIS — Z11.3 ENCOUNTER FOR SCREENING FOR INFECTIONS WITH A PREDOMINANTLY SEXUAL MODE OF TRANSMISSION: ICD-10-CM

## 2025-04-07 DIAGNOSIS — Z12.39 ENCOUNTER FOR OTHER SCREENING FOR MALIGNANT NEOPLASM OF BREAST: ICD-10-CM

## 2025-04-07 DIAGNOSIS — F98.8 OTHER SPECIFIED BEHAVIORAL AND EMOTIONAL DISORDERS WITH ONSET USUALLY OCCURRING IN CHILDHOOD AND ADOLESCENCE: ICD-10-CM

## 2025-04-07 DIAGNOSIS — J45.20 MILD INTERMITTENT ASTHMA, UNCOMPLICATED: ICD-10-CM

## 2025-04-07 DIAGNOSIS — J45.21 MILD INTERMITTENT ASTHMA WITH (ACUTE) EXACERBATION: ICD-10-CM

## 2025-04-07 DIAGNOSIS — Z00.00 ENCOUNTER FOR GENERAL ADULT MEDICAL EXAMINATION W/OUT ABNORMAL FINDINGS: ICD-10-CM

## 2025-04-07 DIAGNOSIS — Z13.21 ENCOUNTER FOR SCREENING FOR NUTRITIONAL DISORDER: ICD-10-CM

## 2025-04-07 DIAGNOSIS — T78.40XA ALLERGY, UNSPECIFIED, INITIAL ENCOUNTER: ICD-10-CM

## 2025-04-07 DIAGNOSIS — Z86.39 PERSONAL HISTORY OF OTHER ENDOCRINE, NUTRITIONAL AND METABOLIC DISEASE: ICD-10-CM

## 2025-04-07 DIAGNOSIS — Z11.52 ENCOUNTER FOR SCREENING FOR COVID-19: ICD-10-CM

## 2025-04-07 DIAGNOSIS — Z87.39 PERSONAL HISTORY OF OTHER DISEASES OF THE MUSCULOSKELETAL SYSTEM AND CONNECTIVE TISSUE: ICD-10-CM

## 2025-04-07 DIAGNOSIS — N95.1 MENOPAUSAL AND FEMALE CLIMACTERIC STATES: ICD-10-CM

## 2025-04-07 DIAGNOSIS — M75.22 BICIPITAL TENDINITIS, LEFT SHOULDER: ICD-10-CM

## 2025-04-07 DIAGNOSIS — M05.79 RHEUMATOID ARTHRITIS WITH RHEUMATOID FACTOR OF MULTIPLE SITES W/OUT ORGAN OR SYSTEMS INVOLVEMENT: ICD-10-CM

## 2025-04-07 DIAGNOSIS — Z80.3 FAMILY HISTORY OF MALIGNANT NEOPLASM OF BREAST: ICD-10-CM

## 2025-04-07 DIAGNOSIS — M75.21 BICIPITAL TENDINITIS, RIGHT SHOULDER: ICD-10-CM

## 2025-04-07 DIAGNOSIS — F41.9 ANXIETY DISORDER, UNSPECIFIED: ICD-10-CM

## 2025-04-07 DIAGNOSIS — Z13.1 ENCOUNTER FOR SCREENING FOR DIABETES MELLITUS: ICD-10-CM

## 2025-04-07 PROCEDURE — 99396 PREV VISIT EST AGE 40-64: CPT | Mod: 25

## 2025-04-07 PROCEDURE — 90739 HEPB VACC 2/4 DOSE ADULT IM: CPT

## 2025-04-07 PROCEDURE — G0010: CPT

## 2025-04-07 RX ORDER — PREDNISONE 10 MG/1
10 TABLET ORAL
Qty: 60 | Refills: 0 | Status: DISCONTINUED | COMMUNITY
Start: 2025-04-01 | End: 2025-04-07

## 2025-04-07 RX ORDER — FEXOFENADINE HCL AND PSEUDOEPHEDRINE HCI 180; 240 MG/1; MG/1
180-240 TABLET, EXTENDED RELEASE ORAL DAILY
Qty: 30 | Refills: 1 | Status: ACTIVE | COMMUNITY
Start: 2025-04-07 | End: 1900-01-01

## 2025-04-07 RX ORDER — OLOPATADINE HYDROCHLORIDE 2 MG/ML
0.2 SOLUTION/ DROPS OPHTHALMIC DAILY
Qty: 1 | Refills: 2 | Status: ACTIVE | COMMUNITY
Start: 2025-04-07 | End: 1900-01-01

## 2025-04-09 RX ORDER — TOFACITINIB 11 MG/1
11 TABLET, FILM COATED, EXTENDED RELEASE ORAL
Qty: 90 | Refills: 1 | Status: ACTIVE | COMMUNITY
Start: 2025-04-01

## 2025-04-15 DIAGNOSIS — D72.10 EOSINOPHILIA, UNSPECIFIED: ICD-10-CM

## 2025-04-15 DIAGNOSIS — Z78.9 OTHER SPECIFIED HEALTH STATUS: ICD-10-CM

## 2025-04-15 LAB
ALBUMIN SERPL ELPH-MCNC: 4.2 G/DL
ALP BLD-CCNC: 62 U/L
ALT SERPL-CCNC: 10 U/L
ANION GAP SERPL CALC-SCNC: 11 MMOL/L
APPEARANCE: CLEAR
AST SERPL-CCNC: 17 U/L
BACTERIA: NEGATIVE /HPF
BASOPHILS # BLD AUTO: 0.08 K/UL
BASOPHILS NFR BLD AUTO: 1.3 %
BILIRUB SERPL-MCNC: 0.7 MG/DL
BILIRUBIN URINE: NEGATIVE
BLOOD URINE: NEGATIVE
BUN SERPL-MCNC: 11 MG/DL
C TRACH RRNA SPEC QL NAA+PROBE: NOT DETECTED
CALCIUM SERPL-MCNC: 9.4 MG/DL
CAST: 0 /LPF
CHLORIDE SERPL-SCNC: 103 MMOL/L
CHOLEST SERPL-MCNC: 171 MG/DL
CO2 SERPL-SCNC: 23 MMOL/L
COLOR: YELLOW
CREAT SERPL-MCNC: 0.63 MG/DL
EGFRCR SERPLBLD CKD-EPI 2021: 108 ML/MIN/1.73M2
EOSINOPHIL # BLD AUTO: 0.52 K/UL
EOSINOPHIL NFR BLD AUTO: 8.3 %
EPITHELIAL CELLS: 7 /HPF
ESTIMATED AVERAGE GLUCOSE: 103 MG/DL
FERRITIN SERPL-MCNC: 107 NG/ML
FOLATE SERPL-MCNC: 11.6 NG/ML
GLUCOSE QUALITATIVE U: NEGATIVE MG/DL
GLUCOSE SERPL-MCNC: 109 MG/DL
HBA1C MFR BLD HPLC: 5.2 %
HBV CORE IGG+IGM SER QL: NONREACTIVE
HBV CORE IGM SER QL: NONREACTIVE
HBV E AB SER QL: NONREACTIVE
HBV E AG SER QL: NONREACTIVE
HBV SURFACE AB SER QL: NONREACTIVE
HBV SURFACE AG SER QL: NONREACTIVE
HCT VFR BLD CALC: 43.4 %
HCV AB SER QL: NONREACTIVE
HCV S/CO RATIO: 0.12 S/CO
HDLC SERPL-MCNC: 88 MG/DL
HGB BLD-MCNC: 13.7 G/DL
HIV1+2 AB SPEC QL IA.RAPID: NONREACTIVE
IMM GRANULOCYTES NFR BLD AUTO: 0.3 %
IRON SATN MFR SERPL: 39 %
IRON SERPL-MCNC: 123 UG/DL
KETONES URINE: NEGATIVE MG/DL
LDLC SERPL-MCNC: 73 MG/DL
LEUKOCYTE ESTERASE URINE: ABNORMAL
LYMPHOCYTES # BLD AUTO: 1.31 K/UL
LYMPHOCYTES NFR BLD AUTO: 20.8 %
MAN DIFF?: NORMAL
MCHC RBC-ENTMCNC: 30.3 PG
MCHC RBC-ENTMCNC: 31.6 G/DL
MCV RBC AUTO: 96 FL
MICROSCOPIC-UA: NORMAL
MONOCYTES # BLD AUTO: 0.73 K/UL
MONOCYTES NFR BLD AUTO: 11.6 %
N GONORRHOEA RRNA SPEC QL NAA+PROBE: NOT DETECTED
NEUTROPHILS # BLD AUTO: 3.63 K/UL
NEUTROPHILS NFR BLD AUTO: 57.7 %
NITRITE URINE: NEGATIVE
NONHDLC SERPL-MCNC: 83 MG/DL
PH URINE: 6.5
PLATELET # BLD AUTO: 255 K/UL
POTASSIUM SERPL-SCNC: 4.3 MMOL/L
PROT SERPL-MCNC: 6.5 G/DL
PROTEIN URINE: NEGATIVE MG/DL
RBC # BLD: 4.52 M/UL
RBC # FLD: 13.3 %
RED BLOOD CELLS URINE: 0 /HPF
SODIUM SERPL-SCNC: 137 MMOL/L
SOURCE AMPLIFICATION: NORMAL
SPECIFIC GRAVITY URINE: 1.01
T PALLIDUM AB SER QL IA: NEGATIVE
TIBC SERPL-MCNC: 312 UG/DL
TRANSFERRIN SERPL-MCNC: 253 MG/DL
TRIGL SERPL-MCNC: 51 MG/DL
TSH SERPL-ACNC: 2.41 UIU/ML
UIBC SERPL-MCNC: 189 UG/DL
UROBILINOGEN URINE: 0.2 MG/DL
VIT B12 SERPL-MCNC: 456 PG/ML
WBC # FLD AUTO: 6.29 K/UL
WHITE BLOOD CELLS URINE: 0 /HPF

## 2025-05-08 ENCOUNTER — APPOINTMENT (OUTPATIENT)
Dept: FAMILY MEDICINE | Facility: CLINIC | Age: 50
End: 2025-05-08
Payer: COMMERCIAL

## 2025-05-08 PROCEDURE — 36415 COLL VENOUS BLD VENIPUNCTURE: CPT

## 2025-05-09 ENCOUNTER — MED ADMIN CHARGE (OUTPATIENT)
Age: 50
End: 2025-05-09

## 2025-05-19 ENCOUNTER — APPOINTMENT (OUTPATIENT)
Dept: OPHTHALMOLOGY | Facility: CLINIC | Age: 50
End: 2025-05-19

## 2025-06-10 ENCOUNTER — NON-APPOINTMENT (OUTPATIENT)
Age: 50
End: 2025-06-10

## 2025-07-01 ENCOUNTER — APPOINTMENT (OUTPATIENT)
Dept: RHEUMATOLOGY | Facility: CLINIC | Age: 50
End: 2025-07-01
Payer: COMMERCIAL

## 2025-07-01 VITALS — OXYGEN SATURATION: 98 % | SYSTOLIC BLOOD PRESSURE: 156 MMHG | DIASTOLIC BLOOD PRESSURE: 102 MMHG | HEART RATE: 70 BPM

## 2025-07-01 PROCEDURE — G2211 COMPLEX E/M VISIT ADD ON: CPT | Mod: NC

## 2025-07-01 PROCEDURE — 36415 COLL VENOUS BLD VENIPUNCTURE: CPT

## 2025-07-01 PROCEDURE — 99214 OFFICE O/P EST MOD 30 MIN: CPT

## 2025-07-01 RX ORDER — METHYLPREDNISOLONE 4 MG/1
4 TABLET ORAL
Qty: 1 | Refills: 2 | Status: ACTIVE | COMMUNITY
Start: 2025-07-01 | End: 1900-01-01

## 2025-07-02 LAB
ALBUMIN SERPL ELPH-MCNC: 4.3 G/DL
ALP BLD-CCNC: 56 U/L
ALT SERPL-CCNC: 22 U/L
ANION GAP SERPL CALC-SCNC: 12 MMOL/L
AST SERPL-CCNC: 27 U/L
BASOPHILS # BLD AUTO: 0.07 K/UL
BASOPHILS NFR BLD AUTO: 1.2 %
BILIRUB SERPL-MCNC: 0.5 MG/DL
BUN SERPL-MCNC: 16 MG/DL
CALCIUM SERPL-MCNC: 9.7 MG/DL
CHLORIDE SERPL-SCNC: 104 MMOL/L
CO2 SERPL-SCNC: 24 MMOL/L
CREAT SERPL-MCNC: 0.57 MG/DL
CRP SERPL-MCNC: <3 MG/L
EGFRCR SERPLBLD CKD-EPI 2021: 111 ML/MIN/1.73M2
EOSINOPHIL # BLD AUTO: 0.31 K/UL
EOSINOPHIL NFR BLD AUTO: 5.1 %
ERYTHROCYTE [SEDIMENTATION RATE] IN BLOOD BY WESTERGREN METHOD: 15 MM/HR
GLUCOSE SERPL-MCNC: 108 MG/DL
HCT VFR BLD CALC: 41.7 %
HGB BLD-MCNC: 13.2 G/DL
IMM GRANULOCYTES NFR BLD AUTO: 0.3 %
LYMPHOCYTES # BLD AUTO: 1.14 K/UL
LYMPHOCYTES NFR BLD AUTO: 18.8 %
MAN DIFF?: NORMAL
MCHC RBC-ENTMCNC: 30.3 PG
MCHC RBC-ENTMCNC: 31.7 G/DL
MCV RBC AUTO: 95.6 FL
MONOCYTES # BLD AUTO: 0.53 K/UL
MONOCYTES NFR BLD AUTO: 8.8 %
NEUTROPHILS # BLD AUTO: 3.98 K/UL
NEUTROPHILS NFR BLD AUTO: 65.8 %
PLATELET # BLD AUTO: 203 K/UL
POTASSIUM SERPL-SCNC: 4.9 MMOL/L
PROT SERPL-MCNC: 6.8 G/DL
RBC # BLD: 4.36 M/UL
RBC # FLD: 13.2 %
SODIUM SERPL-SCNC: 140 MMOL/L
WBC # FLD AUTO: 6.05 K/UL

## 2025-07-11 ENCOUNTER — NON-APPOINTMENT (OUTPATIENT)
Age: 50
End: 2025-07-11

## 2025-07-11 ENCOUNTER — APPOINTMENT (OUTPATIENT)
Dept: OPHTHALMOLOGY | Facility: CLINIC | Age: 50
End: 2025-07-11
Payer: COMMERCIAL

## 2025-07-11 PROCEDURE — 92002 INTRM OPH EXAM NEW PATIENT: CPT

## 2025-07-15 ENCOUNTER — NON-APPOINTMENT (OUTPATIENT)
Age: 50
End: 2025-07-15

## 2025-07-15 ENCOUNTER — APPOINTMENT (OUTPATIENT)
Dept: OPHTHALMOLOGY | Facility: CLINIC | Age: 50
End: 2025-07-15
Payer: COMMERCIAL

## 2025-07-15 PROCEDURE — 92012 INTRM OPH EXAM EST PATIENT: CPT

## 2025-07-21 ENCOUNTER — RX RENEWAL (OUTPATIENT)
Age: 50
End: 2025-07-21

## 2025-07-25 ENCOUNTER — APPOINTMENT (OUTPATIENT)
Dept: OPHTHALMOLOGY | Facility: CLINIC | Age: 50
End: 2025-07-25

## 2025-08-05 ENCOUNTER — APPOINTMENT (OUTPATIENT)
Dept: OPHTHALMOLOGY | Facility: CLINIC | Age: 50
End: 2025-08-05

## 2025-08-25 ENCOUNTER — NON-APPOINTMENT (OUTPATIENT)
Age: 50
End: 2025-08-25

## 2025-08-25 ENCOUNTER — APPOINTMENT (OUTPATIENT)
Dept: OPHTHALMOLOGY | Facility: CLINIC | Age: 50
End: 2025-08-25
Payer: COMMERCIAL

## 2025-08-25 PROCEDURE — 99213 OFFICE O/P EST LOW 20 MIN: CPT

## 2025-09-08 ENCOUNTER — RX RENEWAL (OUTPATIENT)
Age: 50
End: 2025-09-08

## 2025-09-10 ENCOUNTER — APPOINTMENT (OUTPATIENT)
Dept: FAMILY MEDICINE | Facility: CLINIC | Age: 50
End: 2025-09-10
Payer: COMMERCIAL

## 2025-09-10 VITALS
DIASTOLIC BLOOD PRESSURE: 98 MMHG | SYSTOLIC BLOOD PRESSURE: 160 MMHG | WEIGHT: 150 LBS | HEIGHT: 64 IN | BODY MASS INDEX: 25.61 KG/M2 | OXYGEN SATURATION: 98 % | HEART RATE: 86 BPM

## 2025-09-10 DIAGNOSIS — Z01.818 ENCOUNTER FOR OTHER PREPROCEDURAL EXAMINATION: ICD-10-CM

## 2025-09-10 DIAGNOSIS — M79.7 FIBROMYALGIA: ICD-10-CM

## 2025-09-10 DIAGNOSIS — H00.13 CHALAZION RIGHT EYE, UNSPECIFIED EYELID: ICD-10-CM

## 2025-09-10 DIAGNOSIS — F17.210 NICOTINE DEPENDENCE, CIGARETTES, UNCOMPLICATED: ICD-10-CM

## 2025-09-10 DIAGNOSIS — J45.20 MILD INTERMITTENT ASTHMA, UNCOMPLICATED: ICD-10-CM

## 2025-09-10 DIAGNOSIS — H00.16 CHALAZION RIGHT EYE, UNSPECIFIED EYELID: ICD-10-CM

## 2025-09-10 DIAGNOSIS — Z86.2 PERSONAL HISTORY OF DISEASES OF THE BLOOD AND BLOOD-FORMING ORGANS AND CERTAIN DISORDERS INVOLVING THE IMMUNE MECHANISM: ICD-10-CM

## 2025-09-10 PROCEDURE — 99214 OFFICE O/P EST MOD 30 MIN: CPT

## 2025-09-11 LAB
ALBUMIN SERPL ELPH-MCNC: 4.4 G/DL
ALP BLD-CCNC: 50 U/L
ALT SERPL-CCNC: 13 U/L
ANION GAP SERPL CALC-SCNC: 13 MMOL/L
AST SERPL-CCNC: 17 U/L
BASOPHILS # BLD AUTO: 0.09 K/UL
BASOPHILS NFR BLD AUTO: 1 %
BILIRUB SERPL-MCNC: 0.2 MG/DL
BUN SERPL-MCNC: 19 MG/DL
CALCIUM SERPL-MCNC: 9.4 MG/DL
CHLORIDE SERPL-SCNC: 108 MMOL/L
CO2 SERPL-SCNC: 21 MMOL/L
CREAT SERPL-MCNC: 0.57 MG/DL
EGFRCR SERPLBLD CKD-EPI 2021: 111 ML/MIN/1.73M2
EOSINOPHIL # BLD AUTO: 0.39 K/UL
EOSINOPHIL NFR BLD AUTO: 4.5 %
GLUCOSE SERPL-MCNC: 101 MG/DL
HCT VFR BLD CALC: 41.2 %
HGB BLD-MCNC: 13.1 G/DL
IMM GRANULOCYTES NFR BLD AUTO: 0.2 %
LYMPHOCYTES # BLD AUTO: 1.47 K/UL
LYMPHOCYTES NFR BLD AUTO: 16.9 %
MAN DIFF?: NORMAL
MCHC RBC-ENTMCNC: 31 PG
MCHC RBC-ENTMCNC: 31.8 G/DL
MCV RBC AUTO: 97.6 FL
MONOCYTES # BLD AUTO: 0.75 K/UL
MONOCYTES NFR BLD AUTO: 8.6 %
NEUTROPHILS # BLD AUTO: 5.97 K/UL
NEUTROPHILS NFR BLD AUTO: 68.8 %
PLATELET # BLD AUTO: 232 K/UL
POTASSIUM SERPL-SCNC: 4.1 MMOL/L
PROT SERPL-MCNC: 6.5 G/DL
RBC # BLD: 4.22 M/UL
RBC # FLD: 12.9 %
SODIUM SERPL-SCNC: 141 MMOL/L
WBC # FLD AUTO: 8.69 K/UL

## 2025-09-17 DIAGNOSIS — F32.A DEPRESSION, UNSPECIFIED: ICD-10-CM

## 2025-09-17 RX ORDER — BUPROPION HYDROCHLORIDE 300 MG/1
300 TABLET, EXTENDED RELEASE ORAL
Qty: 90 | Refills: 0 | Status: ACTIVE | COMMUNITY
Start: 2025-09-17 | End: 1900-01-01

## 2025-09-17 RX ORDER — BUPROPION HYDROCHLORIDE 150 MG/1
150 TABLET, EXTENDED RELEASE ORAL
Qty: 7 | Refills: 0 | Status: ACTIVE | COMMUNITY
Start: 2025-09-17 | End: 1900-01-01

## (undated) DEVICE — SUT MONOCRYL 5-0 18" P-3 UNDYED

## (undated) DEVICE — POSITIONER STRAP ARMBOARD VELCRO TS-30

## (undated) DEVICE — SYM-TOURNIQUET 3013LAAF: Type: DURABLE MEDICAL EQUIPMENT

## (undated) DEVICE — VENODYNE/SCD SLEEVE CALF MEDIUM

## (undated) DEVICE — GLV 7.5 PROTEXIS (BLUE)

## (undated) DEVICE — TOURNIQUET ESMARK 4"

## (undated) DEVICE — SLING ARM CHIEFTAIN MESH LARGE

## (undated) DEVICE — DRSG KLING 3"

## (undated) DEVICE — GLV 7 PROTEXIS (WHITE)

## (undated) DEVICE — PACK UPPER EXTREMITY

## (undated) DEVICE — WARMING BLANKET LOWER ADULT

## (undated) DEVICE — SYS ENDO STRATOS RELEASE 30 DEG 4MM

## (undated) DEVICE — DRSG WEBRIL 3"

## (undated) DEVICE — POSITIONER FOAM HEAD CRADLE (PINK)

## (undated) DEVICE — NDL HYPO REGULAR BEVEL 25G X 1.5" (BLUE)

## (undated) DEVICE — SPECIMEN CONTAINER PET

## (undated) DEVICE — DRSG KLING 4"

## (undated) DEVICE — SLING ARM CHIEFTAIN MESH MEDIUM

## (undated) DEVICE — DRSG STERISTRIPS 0.5 X 4"

## (undated) DEVICE — DRAPE 3/4 SHEET 52X76"

## (undated) DEVICE — DRAPE TOWEL BLUE 17" X 24"

## (undated) DEVICE — TOURNIQUET CUFF 18" DUAL PORT SINGLE BLADDER W PLC  (BLACK)